# Patient Record
Sex: MALE | Race: BLACK OR AFRICAN AMERICAN | NOT HISPANIC OR LATINO | Employment: FULL TIME | URBAN - METROPOLITAN AREA
[De-identification: names, ages, dates, MRNs, and addresses within clinical notes are randomized per-mention and may not be internally consistent; named-entity substitution may affect disease eponyms.]

---

## 2018-01-20 ENCOUNTER — HOSPITAL ENCOUNTER (EMERGENCY)
Facility: HOSPITAL | Age: 33
Discharge: HOME | End: 2018-01-20
Attending: EMERGENCY MEDICINE

## 2018-01-20 VITALS
SYSTOLIC BLOOD PRESSURE: 108 MMHG | TEMPERATURE: 98 F | HEART RATE: 84 BPM | OXYGEN SATURATION: 99 % | DIASTOLIC BLOOD PRESSURE: 64 MMHG | BODY MASS INDEX: 23.06 KG/M2 | RESPIRATION RATE: 18 BRPM | WEIGHT: 174 LBS | HEIGHT: 73 IN

## 2018-01-20 DIAGNOSIS — T78.40XA ALLERGIC REACTION, INITIAL ENCOUNTER: Primary | ICD-10-CM

## 2018-01-20 DIAGNOSIS — R06.02 SOB (SHORTNESS OF BREATH): ICD-10-CM

## 2018-01-20 PROCEDURE — 63600175 PHARM REV CODE 636 W HCPCS: Performed by: EMERGENCY MEDICINE

## 2018-01-20 PROCEDURE — 25000003 PHARM REV CODE 250: Performed by: EMERGENCY MEDICINE

## 2018-01-20 PROCEDURE — 99284 EMERGENCY DEPT VISIT MOD MDM: CPT | Mod: 25

## 2018-01-20 PROCEDURE — 96375 TX/PRO/DX INJ NEW DRUG ADDON: CPT

## 2018-01-20 PROCEDURE — 96374 THER/PROPH/DIAG INJ IV PUSH: CPT

## 2018-01-20 PROCEDURE — 96361 HYDRATE IV INFUSION ADD-ON: CPT

## 2018-01-20 RX ORDER — PREDNISONE 20 MG/1
10 TABLET ORAL DAILY
Qty: 5 TABLET | Refills: 0 | Status: SHIPPED | OUTPATIENT
Start: 2018-01-21 | End: 2018-01-26

## 2018-01-20 RX ORDER — METHYLPREDNISOLONE SOD SUCC 125 MG
125 VIAL (EA) INJECTION
Status: COMPLETED | OUTPATIENT
Start: 2018-01-20 | End: 2018-01-20

## 2018-01-20 RX ORDER — DIPHENHYDRAMINE HYDROCHLORIDE 50 MG/ML
25 INJECTION INTRAMUSCULAR; INTRAVENOUS
Status: COMPLETED | OUTPATIENT
Start: 2018-01-20 | End: 2018-01-20

## 2018-01-20 RX ADMIN — DIPHENHYDRAMINE HYDROCHLORIDE 25 MG: 50 INJECTION, SOLUTION INTRAMUSCULAR; INTRAVENOUS at 05:01

## 2018-01-20 RX ADMIN — SODIUM CHLORIDE 1000 ML: 0.9 INJECTION, SOLUTION INTRAVENOUS at 05:01

## 2018-01-20 RX ADMIN — METHYLPREDNISOLONE SODIUM SUCCINATE 125 MG: 125 INJECTION, POWDER, FOR SOLUTION INTRAMUSCULAR; INTRAVENOUS at 05:01

## 2018-01-20 NOTE — ED PROVIDER NOTES
Encounter Date: 1/20/2018    SCRIBE #1 NOTE: I, Alan Salgado II, am scribing for, and in the presence of,  Jarvis Alicia MD. I have scribed the following portions of the note - Other sections scribed: HPI, ROS, PE, MDM.       History     Chief Complaint   Patient presents with    Allergic Reaction     Pt reports allergic reaction possibly to peanuts at 4pm. applied calamine lotion to chest. IV benadryl given by EMS. Rash improved. No complaint of respiratory difficulty or difficulty swallowing.      CC: Allergic Reaction     HPI: This 32 y.o. male with no significant PMHx presents to the ED for emergent evaluation of an allergic reaction prior to arrival. Pt reports he had an allergic reaction to an unknown allergen. Pt reports abdominal pain throughout the day but states when he returned home he began feeling warm with an itching sensation diffusely. He reports similar episode when he encounters Baclofen but denies any recent encounter. Pt reports eating peanuts but states he has never had a reaction in the past. Pt reports chest tightness, lip swelling, tongue swelling, and difficulty swallowing but states all these symptoms have since resolved. He reports using Calamine lotion with minimal alleviation. No alleviating or exacerbating factors. Pt denies sore throat, chest pain, and visual disturbance.       The history is provided by the patient. No  was used.     Review of patient's allergies indicates:   Allergen Reactions    Baclofen Anaphylaxis    Vicodin [hydrocodone-acetaminophen] Other (See Comments)     syncope     No past medical history on file.  No past surgical history on file.  Family History   Problem Relation Age of Onset    Diabetes Mother     Heart disease Mother     Hypertension Mother      Social History   Substance Use Topics    Smoking status: Current Every Day Smoker     Packs/day: 1.00     Types: Cigarettes    Smokeless tobacco: Never Used    Alcohol use No      Review of Systems   Constitutional: Negative for chills, diaphoresis and fever.   HENT: Negative for ear pain and sore throat.    Eyes:        (-) eye problems   Respiratory: Negative for cough and shortness of breath.    Cardiovascular: Negative for chest pain.   Gastrointestinal: Positive for abdominal pain and nausea. Negative for diarrhea and vomiting.   Genitourinary: Negative for dysuria.   Musculoskeletal: Negative for back pain.        (-) arm or leg pain   Skin: Negative for rash.   Neurological: Negative for headaches.       Physical Exam     Initial Vitals [01/20/18 1719]   BP Pulse Resp Temp SpO2   104/62 89 18 98.1 °F (36.7 °C) 98 %      MAP       76         Physical Exam    Nursing note and vitals reviewed.  Constitutional: He appears well-developed and well-nourished. No distress.   HENT:   Head: Normocephalic and atraumatic.   Mouth/Throat: Uvula is midline. No uvula swelling. Posterior oropharyngeal erythema present.     No lip or tongue swelling    No movement of uvula        Eyes: Conjunctivae and EOM are normal. Pupils are equal, round, and reactive to light.   Neck: Normal range of motion. Neck supple.   Cardiovascular: Normal rate, S1 normal, S2 normal and normal heart sounds.   Pulses:       Radial pulses are 2+ on the right side, and 2+ on the left side.   Pulmonary/Chest: Effort normal and breath sounds normal.   Abdominal: Soft. Normal appearance. Bowel sounds are decreased. There is no tenderness. There is no CVA tenderness.            Musculoskeletal: Normal range of motion.   Neurological: He is alert and oriented to person, place, and time. He has normal strength. No cranial nerve deficit or sensory deficit.   Skin: Skin is warm and dry. Rash noted.   Confluent red patch 4 cm in diameter at right proximal  medial scapula    Psychiatric: He has a normal mood and affect. His behavior is normal. Thought content normal.           ED Course   Procedures  Labs Reviewed - No data to  display     Imaging Results          X-Ray Chest 1 View (Final result)  Result time 01/20/18 18:26:53    Final result by Alton Simental MD (01/20/18 18:26:53)                 Impression:      No acute cardiopulmonary process identified.      Electronically signed by: ALTON SIMENTAL MD  Date:     01/20/18  Time:    18:26              Narrative:    Chest AP single view.  Comparison: None.    Cardiac silhouette is normal in size.  Mediastinal structures are midline.  Lungs are symmetrically expanded.  No evidence of focal consolidative process, pneumothorax, or significant effusion.  No acute osseous abnormality identified.                                 Medical Decision Making:   Initial Assessment:   Pt presented with an allergic reaction to an unknown allergen. He reports having abdominal pain with nausea. Pt reports tx using Calamine with minimal alleviation. Upon reevaluation the pt reports itching sensation has since resolved.  Differential Diagnosis:   We are ruling out anaphylaxis, urticaria, drug induced vs food induced allergic response  ED Management:  IV steroids and continue to monitor respiratory and cardiac status.   Upon reevaluation, Spouse states the pt is looking back to normal. The pt will be discharged with daily Prednisone and Benadryl  For the next 5 days            Scribe Attestation:   Scribe #1: I performed the above scribed service and the documentation accurately describes the services I performed. I attest to the accuracy of the note.    Attending Attestation:           Physician Attestation for Scribe:  Physician Attestation Statement for Scribe #1: I, Jarvis Alicia MD, reviewed documentation, as scribed by Alan Salgado II in my presence, and it is both accurate and complete.                 ED Course      Clinical Impression:   The encounter diagnosis was SOB (shortness of breath).  Allergic reaction - unknown cause          Disposition:   Disposition: Discharged  Condition:  Stable       Scribe attestation:  I agree with the  information collected for this note by the scribe, including history of present illness, review of systems, physical exam, and the plan                   Jarvis Alicia MD  01/20/18 4181

## 2018-01-20 NOTE — ED TRIAGE NOTES
Pt to the ED via EMS with c/o allergic reaction. Pt reports he was sitting on couch at home when he felt a burning sensation all over his body, he began itching and noticed hives. Pt reports SOB upon event. Upon current assessment no SOB or acute distress noted. Pt 98% on RA. Pt reports EMS gave benadryl IV prior to arrival.

## 2018-01-21 NOTE — ED NOTES
Report received from FLO Dugan. Pt resting comfortably in bed. In no acute distress. REU. AAO x 4. Will continue to monitor.

## 2018-01-21 NOTE — ED NOTES
Pt resting comfortably in bed. Fluids infusing. Denies SOB, itching, or pain. REU. AAO x 4. Will continue to monitor.

## 2018-01-21 NOTE — DISCHARGE INSTRUCTIONS
You can obtain benadryl tablets/capsules over the counter - these are  25 mg doses. Start taking one of these every 8 hours starting tomorrow morning and do so for the next 5 days. Be careful that benadryl can make you sleepy.   Be sure to keep track of your reactions to foods and any chemicals that might trigger skin or lip/tongue /swallowing/breathing problems   If you feel any swelling at your mouth or any problems breathing, come to the ED ASAP

## 2018-02-18 ENCOUNTER — HOSPITAL ENCOUNTER (EMERGENCY)
Facility: HOSPITAL | Age: 33
Discharge: HOME | End: 2018-02-18
Attending: EMERGENCY MEDICINE

## 2018-02-18 VITALS
SYSTOLIC BLOOD PRESSURE: 103 MMHG | BODY MASS INDEX: 22.93 KG/M2 | TEMPERATURE: 99 F | RESPIRATION RATE: 18 BRPM | HEART RATE: 70 BPM | OXYGEN SATURATION: 96 % | WEIGHT: 173 LBS | HEIGHT: 73 IN | DIASTOLIC BLOOD PRESSURE: 57 MMHG

## 2018-02-18 DIAGNOSIS — R50.9 ACUTE FEBRILE ILLNESS: ICD-10-CM

## 2018-02-18 DIAGNOSIS — J18.9 PNEUMONIA OF RIGHT LOWER LOBE DUE TO INFECTIOUS ORGANISM: ICD-10-CM

## 2018-02-18 DIAGNOSIS — R68.89 FLU-LIKE SYMPTOMS: Primary | ICD-10-CM

## 2018-02-18 DIAGNOSIS — R06.02 SHORTNESS OF BREATH: ICD-10-CM

## 2018-02-18 LAB
FLUAV AG SPEC QL IA: NEGATIVE
FLUBV AG SPEC QL IA: NEGATIVE
SPECIMEN SOURCE: NORMAL

## 2018-02-18 PROCEDURE — 25000242 PHARM REV CODE 250 ALT 637 W/ HCPCS: Performed by: EMERGENCY MEDICINE

## 2018-02-18 PROCEDURE — 94640 AIRWAY INHALATION TREATMENT: CPT

## 2018-02-18 PROCEDURE — 63600175 PHARM REV CODE 636 W HCPCS: Performed by: EMERGENCY MEDICINE

## 2018-02-18 PROCEDURE — 25000003 PHARM REV CODE 250: Performed by: EMERGENCY MEDICINE

## 2018-02-18 PROCEDURE — 96372 THER/PROPH/DIAG INJ SC/IM: CPT

## 2018-02-18 PROCEDURE — 25000003 PHARM REV CODE 250: Performed by: NURSE PRACTITIONER

## 2018-02-18 PROCEDURE — 99284 EMERGENCY DEPT VISIT MOD MDM: CPT | Mod: 25

## 2018-02-18 PROCEDURE — 87400 INFLUENZA A/B EACH AG IA: CPT

## 2018-02-18 RX ORDER — DOXYCYCLINE 100 MG/1
100 CAPSULE ORAL 2 TIMES DAILY
Qty: 20 CAPSULE | Refills: 0 | Status: SHIPPED | OUTPATIENT
Start: 2018-02-18 | End: 2018-02-28

## 2018-02-18 RX ORDER — ACETAMINOPHEN 500 MG
1000 TABLET ORAL
Status: COMPLETED | OUTPATIENT
Start: 2018-02-18 | End: 2018-02-18

## 2018-02-18 RX ORDER — IBUPROFEN 600 MG/1
600 TABLET ORAL
Status: COMPLETED | OUTPATIENT
Start: 2018-02-18 | End: 2018-02-18

## 2018-02-18 RX ORDER — KETOROLAC TROMETHAMINE 30 MG/ML
30 INJECTION, SOLUTION INTRAMUSCULAR; INTRAVENOUS
Status: COMPLETED | OUTPATIENT
Start: 2018-02-18 | End: 2018-02-18

## 2018-02-18 RX ORDER — DEXAMETHASONE SODIUM PHOSPHATE 4 MG/ML
8 INJECTION, SOLUTION INTRA-ARTICULAR; INTRALESIONAL; INTRAMUSCULAR; INTRAVENOUS; SOFT TISSUE
Status: COMPLETED | OUTPATIENT
Start: 2018-02-18 | End: 2018-02-18

## 2018-02-18 RX ORDER — OSELTAMIVIR PHOSPHATE 75 MG/1
75 CAPSULE ORAL 2 TIMES DAILY
Qty: 10 CAPSULE | Refills: 0 | Status: SHIPPED | OUTPATIENT
Start: 2018-02-18 | End: 2018-02-23

## 2018-02-18 RX ORDER — CODEINE PHOSPHATE AND GUAIFENESIN 10; 100 MG/5ML; MG/5ML
5 SOLUTION ORAL 3 TIMES DAILY PRN
Qty: 118 ML | Refills: 0 | Status: SHIPPED | OUTPATIENT
Start: 2018-02-18 | End: 2018-02-28

## 2018-02-18 RX ORDER — ALBUTEROL SULFATE 90 UG/1
2 AEROSOL, METERED RESPIRATORY (INHALATION) EVERY 4 HOURS PRN
Qty: 1 INHALER | Refills: 1 | Status: ON HOLD | OUTPATIENT
Start: 2018-02-18 | End: 2018-03-20

## 2018-02-18 RX ORDER — ONDANSETRON 8 MG/1
8 TABLET, ORALLY DISINTEGRATING ORAL
Status: COMPLETED | OUTPATIENT
Start: 2018-02-18 | End: 2018-02-18

## 2018-02-18 RX ORDER — MAG HYDROX/ALUMINUM HYD/SIMETH 200-200-20
30 SUSPENSION, ORAL (FINAL DOSE FORM) ORAL
Status: COMPLETED | OUTPATIENT
Start: 2018-02-18 | End: 2018-02-18

## 2018-02-18 RX ORDER — IPRATROPIUM BROMIDE AND ALBUTEROL SULFATE 2.5; .5 MG/3ML; MG/3ML
3 SOLUTION RESPIRATORY (INHALATION)
Status: COMPLETED | OUTPATIENT
Start: 2018-02-18 | End: 2018-02-18

## 2018-02-18 RX ADMIN — IPRATROPIUM BROMIDE AND ALBUTEROL SULFATE 3 ML: .5; 2.5 SOLUTION RESPIRATORY (INHALATION) at 07:02

## 2018-02-18 RX ADMIN — ALUMINUM HYDROXIDE, MAGNESIUM HYDROXIDE, AND SIMETHICONE 30 ML: 200; 200; 20 SUSPENSION ORAL at 06:02

## 2018-02-18 RX ADMIN — ONDANSETRON 8 MG: 8 TABLET, ORALLY DISINTEGRATING ORAL at 07:02

## 2018-02-18 RX ADMIN — KETOROLAC TROMETHAMINE 30 MG: 30 INJECTION, SOLUTION INTRAMUSCULAR at 07:02

## 2018-02-18 RX ADMIN — IBUPROFEN 600 MG: 600 TABLET, FILM COATED ORAL at 06:02

## 2018-02-18 RX ADMIN — ACETAMINOPHEN 1000 MG: 500 TABLET ORAL at 06:02

## 2018-02-18 RX ADMIN — DEXAMETHASONE SODIUM PHOSPHATE 8 MG: 4 INJECTION, SOLUTION INTRAMUSCULAR; INTRAVENOUS at 07:02

## 2018-02-19 NOTE — ED PROVIDER NOTES
Encounter Date: 2/18/2018       History     Chief Complaint   Patient presents with    Generalized Body Aches     body aches, cough - dry, sore throat, rib pains, headaches, chills, sweats, vomiting, diarrhea, and fever all started last pm.     The history is provided by the patient and medical records.   URI   The primary symptoms include fever, fatigue, cough, wheezing, nausea and myalgias. Primary symptoms do not include headaches, sore throat, swollen glands, abdominal pain, vomiting, arthralgias or rash. The current episode started two days ago. This is a new problem. The problem has been gradually worsening. The maximum temperature recorded prior to his arrival was 101 to 101.9 F.   The cough is productive. The sputum is yellow.     The wheezing had no precipitant. The patient's medical history does not include asthma, COPD, bronchiolitis or chronic lung disease.   Associated with: No known sick contacts or recent travel. The following treatments were addressed: Acetaminophen was ineffective. A decongestant was ineffective. NSAIDs were ineffective.     Review of patient's allergies indicates:   Allergen Reactions    Baclofen Anaphylaxis    Vicodin [hydrocodone-acetaminophen] Other (See Comments)     syncope     History reviewed. No pertinent past medical history.  Past Surgical History:   Procedure Laterality Date    WRIST SURGERY      As a child, L     Family History   Problem Relation Age of Onset    Diabetes Mother     Heart disease Mother     Hypertension Mother      Social History   Substance Use Topics    Smoking status: Former Smoker     Packs/day: 1.00     Types: Cigarettes    Smokeless tobacco: Never Used    Alcohol use No     Review of Systems   Constitutional: Positive for fatigue and fever.   HENT: Negative for sore throat.    Respiratory: Positive for cough and wheezing.    Gastrointestinal: Positive for nausea. Negative for abdominal pain and vomiting.   Musculoskeletal: Positive for  myalgias. Negative for arthralgias.   Skin: Negative for rash.   Neurological: Negative for headaches.   All other systems reviewed and are negative.        Physical Exam     Initial Vitals [18 1756]   BP Pulse Resp Temp SpO2   128/87 91 18 (!) 101.4 °F (38.6 °C) 98 %      MAP       100.67         Physical Exam    Nursing note and vitals reviewed.  Constitutional: He appears well-developed and well-nourished.   HENT:   Head: Normocephalic and atraumatic.   Eyes: Conjunctivae are normal. No scleral icterus.   Neck: Normal range of motion. Neck supple.   Cardiovascular: Normal rate and regular rhythm.   Pulmonary/Chest: No respiratory distress.   Diminished BS with slight expiratory wheezing lower lung fields.   Abdominal: Soft. There is no tenderness.   Musculoskeletal: Normal range of motion. He exhibits no edema or tenderness.   Neurological: He is alert and oriented to person, place, and time. He has normal strength.   Skin: Skin is warm and dry.   Psychiatric: He has a normal mood and affect. Thought content normal.         ED Course   Procedures  Labs Reviewed   INFLUENZA A AND B ANTIGEN             Medical Decision Making:   History:   Old Medical Records: I decided to obtain old medical records.  Old Records Summarized: other records.  Independently Interpreted Test(s):   I have ordered and independently interpreted X-rays - see prior notes.  Clinical Tests:   Lab Tests: Reviewed and Ordered  Radiological Study: Ordered and Reviewed      Differential diagnosis:   Initial differential diagnosis includes but is not limited to... URI, bronchitis, pneumonia, influenza, viral syndrome    Additional Medical Decision Makin-year-old male with no significant past medical history presents the emergency department complaining of flulike symptoms for the past 48 hours - see hpi for further details.  Physical exam reveals an uncomfortable-appearing otherwise healthy young male in no obvious distress.  He has  diminished overall breath sounds with faint wheezing to his lower lung bases.  Initial temp 101.4°F.  Chest x-ray pending.  IM Decadron given; albuterol/Atrovent nebs administered.        - Findings at this time are most consistent with bronchospasm secondary to influenza vs .  He has been started on Tamiflu and has been prescribed an albuterol inhaler to be used as needed.                      Clinical Impression:   The primary encounter diagnosis was Flu-like symptoms. Diagnoses of Shortness of breath, Pneumonia of right lower lobe due to infectious organism, and Acute febrile illness were also pertinent to this visit.    Disposition:   Disposition: Discharged  Condition: Stable                        Zeeshan oHlliday MD  02/18/18 2011

## 2018-02-19 NOTE — ED TRIAGE NOTES
Pt states that flu like symptoms x 2 days. Pt c/o headache, body aches, fever, nausea and nasal drainage.

## 2018-04-13 ENCOUNTER — HOSPITAL ENCOUNTER (OUTPATIENT)
Facility: HOSPITAL | Age: 33
Discharge: HOME | End: 2018-04-14
Attending: EMERGENCY MEDICINE | Admitting: HOSPITALIST

## 2018-04-13 DIAGNOSIS — K56.7 ILEUS: ICD-10-CM

## 2018-04-13 DIAGNOSIS — R65.20 SEVERE SEPSIS: ICD-10-CM

## 2018-04-13 DIAGNOSIS — R06.02 SOB (SHORTNESS OF BREATH): ICD-10-CM

## 2018-04-13 DIAGNOSIS — R11.2 NON-INTRACTABLE VOMITING WITH NAUSEA, UNSPECIFIED VOMITING TYPE: ICD-10-CM

## 2018-04-13 DIAGNOSIS — J18.9 PNEUMONIA OF RIGHT LOWER LOBE DUE TO INFECTIOUS ORGANISM: Primary | ICD-10-CM

## 2018-04-13 DIAGNOSIS — A41.9 SEVERE SEPSIS: ICD-10-CM

## 2018-04-13 DIAGNOSIS — A41.9 SEPSIS, DUE TO UNSPECIFIED ORGANISM: ICD-10-CM

## 2018-04-13 PROBLEM — G40.909 SEIZURE DISORDER: Status: ACTIVE | Noted: 2018-04-13

## 2018-04-13 PROBLEM — E11.9 TYPE 2 DIABETES MELLITUS: Status: ACTIVE | Noted: 2018-04-13

## 2018-04-13 LAB
ALBUMIN SERPL BCP-MCNC: 4 G/DL
ALP SERPL-CCNC: 75 U/L
ALT SERPL W/O P-5'-P-CCNC: 17 U/L
ANION GAP SERPL CALC-SCNC: 9 MMOL/L
APTT BLDCRRT: 28.1 SEC
AST SERPL-CCNC: 19 U/L
BACTERIA #/AREA URNS HPF: NORMAL /HPF
BASOPHILS # BLD AUTO: ABNORMAL K/UL
BASOPHILS NFR BLD: 0 %
BILIRUB SERPL-MCNC: 1.4 MG/DL
BILIRUB UR QL STRIP: NEGATIVE
BNP SERPL-MCNC: 87 PG/ML
BUN SERPL-MCNC: 12 MG/DL
CALCIUM SERPL-MCNC: 9.8 MG/DL
CHLORIDE SERPL-SCNC: 100 MMOL/L
CLARITY UR: CLEAR
CO2 SERPL-SCNC: 24 MMOL/L
COLOR UR: YELLOW
CREAT SERPL-MCNC: 1.2 MG/DL
DEPRECATED S PYO AG THROAT QL EIA: NEGATIVE
DIFFERENTIAL METHOD: ABNORMAL
EOSINOPHIL # BLD AUTO: ABNORMAL K/UL
EOSINOPHIL NFR BLD: 0 %
ERYTHROCYTE [DISTWIDTH] IN BLOOD BY AUTOMATED COUNT: 12.9 %
EST. GFR  (AFRICAN AMERICAN): >60 ML/MIN/1.73 M^2
EST. GFR  (NON AFRICAN AMERICAN): >60 ML/MIN/1.73 M^2
GLUCOSE SERPL-MCNC: 105 MG/DL
GLUCOSE UR QL STRIP: NEGATIVE
HCT VFR BLD AUTO: 42.3 %
HGB BLD-MCNC: 14.7 G/DL
HGB UR QL STRIP: ABNORMAL
INR PPP: 1.4
KETONES UR QL STRIP: ABNORMAL
LACTATE SERPL-SCNC: 2.1 MMOL/L
LACTATE SERPL-SCNC: 2.6 MMOL/L
LEUKOCYTE ESTERASE UR QL STRIP: NEGATIVE
LIPASE SERPL-CCNC: 8 U/L
LIPASE SERPL-CCNC: 9 U/L
LYMPHOCYTES # BLD AUTO: ABNORMAL K/UL
LYMPHOCYTES NFR BLD: 2 %
MAGNESIUM SERPL-MCNC: 1.6 MG/DL
MCH RBC QN AUTO: 32.3 PG
MCHC RBC AUTO-ENTMCNC: 34.8 G/DL
MCV RBC AUTO: 93 FL
MICROSCOPIC COMMENT: NORMAL
MONOCYTES # BLD AUTO: ABNORMAL K/UL
MONOCYTES NFR BLD: 3 %
NEUTROPHILS NFR BLD: 67 %
NEUTS BAND NFR BLD MANUAL: 28 %
NITRITE UR QL STRIP: NEGATIVE
PH UR STRIP: 7 [PH] (ref 5–8)
PHOSPHATE SERPL-MCNC: 3.4 MG/DL
PLATELET # BLD AUTO: 252 K/UL
PMV BLD AUTO: 10.3 FL
POCT GLUCOSE: 98 MG/DL (ref 70–110)
POTASSIUM SERPL-SCNC: 4.1 MMOL/L
PROT SERPL-MCNC: 7.9 G/DL
PROT UR QL STRIP: NEGATIVE
PROTHROMBIN TIME: 14.6 SEC
RBC # BLD AUTO: 4.55 M/UL
RBC #/AREA URNS HPF: 2 /HPF (ref 0–4)
SODIUM SERPL-SCNC: 133 MMOL/L
SP GR UR STRIP: 1.01 (ref 1–1.03)
SQUAMOUS #/AREA URNS HPF: 4 /HPF
TROPONIN I SERPL DL<=0.01 NG/ML-MCNC: <0.006 NG/ML
URN SPEC COLLECT METH UR: ABNORMAL
UROBILINOGEN UR STRIP-ACNC: NEGATIVE EU/DL
WBC # BLD AUTO: 25.64 K/UL
WBC #/AREA URNS HPF: 1 /HPF (ref 0–5)

## 2018-04-13 PROCEDURE — 80053 COMPREHEN METABOLIC PANEL: CPT

## 2018-04-13 PROCEDURE — 36415 COLL VENOUS BLD VENIPUNCTURE: CPT

## 2018-04-13 PROCEDURE — 94640 AIRWAY INHALATION TREATMENT: CPT

## 2018-04-13 PROCEDURE — 96365 THER/PROPH/DIAG IV INF INIT: CPT

## 2018-04-13 PROCEDURE — 25000003 PHARM REV CODE 250: Performed by: NURSE PRACTITIONER

## 2018-04-13 PROCEDURE — 84100 ASSAY OF PHOSPHORUS: CPT

## 2018-04-13 PROCEDURE — 63600175 PHARM REV CODE 636 W HCPCS: Performed by: NURSE PRACTITIONER

## 2018-04-13 PROCEDURE — 81000 URINALYSIS NONAUTO W/SCOPE: CPT

## 2018-04-13 PROCEDURE — 96375 TX/PRO/DX INJ NEW DRUG ADDON: CPT

## 2018-04-13 PROCEDURE — 25500020 PHARM REV CODE 255: Performed by: EMERGENCY MEDICINE

## 2018-04-13 PROCEDURE — 83690 ASSAY OF LIPASE: CPT | Mod: 91

## 2018-04-13 PROCEDURE — 85730 THROMBOPLASTIN TIME PARTIAL: CPT

## 2018-04-13 PROCEDURE — 83735 ASSAY OF MAGNESIUM: CPT

## 2018-04-13 PROCEDURE — 84484 ASSAY OF TROPONIN QUANT: CPT

## 2018-04-13 PROCEDURE — 83605 ASSAY OF LACTIC ACID: CPT | Mod: 91

## 2018-04-13 PROCEDURE — 96361 HYDRATE IV INFUSION ADD-ON: CPT

## 2018-04-13 PROCEDURE — G0378 HOSPITAL OBSERVATION PER HR: HCPCS

## 2018-04-13 PROCEDURE — 85027 COMPLETE CBC AUTOMATED: CPT

## 2018-04-13 PROCEDURE — 85007 BL SMEAR W/DIFF WBC COUNT: CPT

## 2018-04-13 PROCEDURE — 87880 STREP A ASSAY W/OPTIC: CPT

## 2018-04-13 PROCEDURE — 93010 ELECTROCARDIOGRAM REPORT: CPT | Mod: ,,, | Performed by: INTERNAL MEDICINE

## 2018-04-13 PROCEDURE — 83880 ASSAY OF NATRIURETIC PEPTIDE: CPT

## 2018-04-13 PROCEDURE — 87040 BLOOD CULTURE FOR BACTERIA: CPT | Mod: 59

## 2018-04-13 PROCEDURE — 93005 ELECTROCARDIOGRAM TRACING: CPT

## 2018-04-13 PROCEDURE — 25000003 PHARM REV CODE 250: Performed by: HOSPITALIST

## 2018-04-13 PROCEDURE — 83036 HEMOGLOBIN GLYCOSYLATED A1C: CPT

## 2018-04-13 PROCEDURE — 83605 ASSAY OF LACTIC ACID: CPT

## 2018-04-13 PROCEDURE — 25000242 PHARM REV CODE 250 ALT 637 W/ HCPCS: Performed by: NURSE PRACTITIONER

## 2018-04-13 PROCEDURE — 84145 PROCALCITONIN (PCT): CPT

## 2018-04-13 PROCEDURE — 87086 URINE CULTURE/COLONY COUNT: CPT

## 2018-04-13 PROCEDURE — 99285 EMERGENCY DEPT VISIT HI MDM: CPT | Mod: 25

## 2018-04-13 PROCEDURE — 85610 PROTHROMBIN TIME: CPT

## 2018-04-13 PROCEDURE — 87081 CULTURE SCREEN ONLY: CPT

## 2018-04-13 RX ORDER — MORPHINE SULFATE 4 MG/ML
4 INJECTION, SOLUTION INTRAMUSCULAR; INTRAVENOUS EVERY 6 HOURS PRN
Status: DISCONTINUED | OUTPATIENT
Start: 2018-04-13 | End: 2018-04-14 | Stop reason: HOSPADM

## 2018-04-13 RX ORDER — INSULIN ASPART 100 [IU]/ML
0-5 INJECTION, SOLUTION INTRAVENOUS; SUBCUTANEOUS
Status: DISCONTINUED | OUTPATIENT
Start: 2018-04-13 | End: 2018-04-14 | Stop reason: HOSPADM

## 2018-04-13 RX ORDER — ALBUTEROL SULFATE 2.5 MG/.5ML
2.5 SOLUTION RESPIRATORY (INHALATION) EVERY 6 HOURS
Status: DISCONTINUED | OUTPATIENT
Start: 2018-04-13 | End: 2018-04-14 | Stop reason: HOSPADM

## 2018-04-13 RX ORDER — ALBUTEROL SULFATE 90 UG/1
2 AEROSOL, METERED RESPIRATORY (INHALATION) EVERY 4 HOURS PRN
Status: CANCELLED | OUTPATIENT
Start: 2018-04-13

## 2018-04-13 RX ORDER — ACETAMINOPHEN 325 MG/1
650 TABLET ORAL EVERY 6 HOURS PRN
Status: DISCONTINUED | OUTPATIENT
Start: 2018-04-13 | End: 2018-04-14 | Stop reason: HOSPADM

## 2018-04-13 RX ORDER — GLUCAGON 1 MG
1 KIT INJECTION
Status: DISCONTINUED | OUTPATIENT
Start: 2018-04-13 | End: 2018-04-14 | Stop reason: HOSPADM

## 2018-04-13 RX ORDER — IBUPROFEN 200 MG
16 TABLET ORAL
Status: DISCONTINUED | OUTPATIENT
Start: 2018-04-13 | End: 2018-04-14 | Stop reason: HOSPADM

## 2018-04-13 RX ORDER — CEFTRIAXONE 2 G/50ML
2 INJECTION, SOLUTION INTRAVENOUS
Status: COMPLETED | OUTPATIENT
Start: 2018-04-13 | End: 2018-04-13

## 2018-04-13 RX ORDER — IPRATROPIUM BROMIDE AND ALBUTEROL SULFATE 2.5; .5 MG/3ML; MG/3ML
3 SOLUTION RESPIRATORY (INHALATION) EVERY 6 HOURS PRN
Status: DISCONTINUED | OUTPATIENT
Start: 2018-04-13 | End: 2018-04-14 | Stop reason: HOSPADM

## 2018-04-13 RX ORDER — OXYCODONE AND ACETAMINOPHEN 5; 325 MG/1; MG/1
1 TABLET ORAL EVERY 6 HOURS PRN
Status: DISCONTINUED | OUTPATIENT
Start: 2018-04-13 | End: 2018-04-14 | Stop reason: HOSPADM

## 2018-04-13 RX ORDER — ONDANSETRON 2 MG/ML
4 INJECTION INTRAMUSCULAR; INTRAVENOUS
Status: COMPLETED | OUTPATIENT
Start: 2018-04-13 | End: 2018-04-13

## 2018-04-13 RX ORDER — SODIUM CHLORIDE 9 MG/ML
INJECTION, SOLUTION INTRAVENOUS CONTINUOUS
Status: DISCONTINUED | OUTPATIENT
Start: 2018-04-13 | End: 2018-04-14 | Stop reason: HOSPADM

## 2018-04-13 RX ORDER — IBUPROFEN 200 MG
24 TABLET ORAL
Status: DISCONTINUED | OUTPATIENT
Start: 2018-04-13 | End: 2018-04-14 | Stop reason: HOSPADM

## 2018-04-13 RX ORDER — ONDANSETRON 8 MG/1
8 TABLET, ORALLY DISINTEGRATING ORAL EVERY 8 HOURS PRN
Status: DISCONTINUED | OUTPATIENT
Start: 2018-04-13 | End: 2018-04-14 | Stop reason: HOSPADM

## 2018-04-13 RX ADMIN — SODIUM CHLORIDE: 0.9 INJECTION, SOLUTION INTRAVENOUS at 06:04

## 2018-04-13 RX ADMIN — ACETAMINOPHEN 650 MG: 325 TABLET ORAL at 05:04

## 2018-04-13 RX ADMIN — ONDANSETRON 8 MG: 8 TABLET, ORALLY DISINTEGRATING ORAL at 05:04

## 2018-04-13 RX ADMIN — CEFTRIAXONE 2 G: 2 INJECTION, SOLUTION INTRAVENOUS at 03:04

## 2018-04-13 RX ADMIN — ALBUTEROL SULFATE 2.5 MG: 2.5 SOLUTION RESPIRATORY (INHALATION) at 08:04

## 2018-04-13 RX ADMIN — AZITHROMYCIN MONOHYDRATE 500 MG: 500 INJECTION, POWDER, LYOPHILIZED, FOR SOLUTION INTRAVENOUS at 03:04

## 2018-04-13 RX ADMIN — SODIUM CHLORIDE 1355 ML: 0.9 INJECTION, SOLUTION INTRAVENOUS at 01:04

## 2018-04-13 RX ADMIN — SODIUM CHLORIDE 1000 ML: 0.9 INJECTION, SOLUTION INTRAVENOUS at 12:04

## 2018-04-13 RX ADMIN — ONDANSETRON 4 MG: 2 INJECTION INTRAMUSCULAR; INTRAVENOUS at 12:04

## 2018-04-13 RX ADMIN — IOHEXOL 75 ML: 350 INJECTION, SOLUTION INTRAVENOUS at 02:04

## 2018-04-13 NOTE — ASSESSMENT & PLAN NOTE
With leucocytosis,tachypnea,tachycardia,elevated lactci acid,will continue with agressive IVF,IV Abx,recheck lactic acid level.

## 2018-04-13 NOTE — HPI
Patient is a 32-year-old male with past medical history asthma,DM,HTN,seizuer , who reports last 2 days he started with generalized body aches the proceeded to chills and fever that was subjective followed by nausea and vomiting that was bilious at times.  He reported a decreased appetite. He is reported to have a history of pneumonia in the right lower lobe 2 months ago and was prescribed antibiotics but reported that he did not take them.patient  Has severe sepsis with leucocytosis 25,tachypnea,tachycardia and elevated lactic acid 2.6.CT abdomen show left lower lobe pneumonia,he has also mild ileus,with no sign of  mass or obstruction,he denies abdominal pain,he has BM,patient has been started on IV Abx and IV antiemetic.

## 2018-04-13 NOTE — ED TRIAGE NOTES
C/o body ache x 2 days.  C/o Chills, fever, Nausea x 2 day. C/o SOB. Rib pain . Rate 10. Vomiting x 1 day.  Vomited x 10 today. Reports dysuria. Reports  Using girlfriend's breathing Tx prior to visiting ED.

## 2018-04-13 NOTE — ED NOTES
While giving report to Merari ROSSI Patient c/o nausea and achy bones again  Medicated as ordered.

## 2018-04-13 NOTE — ED PROVIDER NOTES
Encounter Date: 4/13/2018       History     Chief Complaint   Patient presents with    Emesis     reports vomitting sfor past two days, with accompanied loss of appetite, and weakness, reports SOB     Chief complaint: Emesis    History of present illness: Patient is a 32-year-old male who reports last 2 days he started with generalized body aches the proceeded to chills and fever that was subjective followed by nausea and vomiting that was bilious at times.  He reported a decreased appetite.  History significant for asthma, diabetes, hypertension, and seizures.  He is reported to have a history of pneumonia in the right lower lobe 2 months ago and was prescribed antibiotics but reported that he did not take them.          Review of patient's allergies indicates:   Allergen Reactions    Baclofen Anaphylaxis    Vicodin [hydrocodone-acetaminophen] Other (See Comments)     syncope     History reviewed. No pertinent past medical history.  Past Surgical History:   Procedure Laterality Date    WRIST SURGERY      As a child, L     Family History   Problem Relation Age of Onset    Diabetes Mother     Heart disease Mother     Hypertension Mother      Social History   Substance Use Topics    Smoking status: Former Smoker     Packs/day: 1.00     Types: Cigarettes    Smokeless tobacco: Never Used    Alcohol use No     Review of Systems   Constitutional: Positive for appetite change, chills and fever. Negative for diaphoresis and fatigue.   HENT: Negative for congestion, ear discharge, ear pain, postnasal drip, rhinorrhea, sinus pressure, sneezing, sore throat and voice change.    Eyes: Negative for discharge, itching and visual disturbance.   Respiratory: Negative for cough, shortness of breath and wheezing.    Cardiovascular: Negative for chest pain, palpitations and leg swelling.   Gastrointestinal: Positive for nausea and vomiting. Negative for abdominal pain.   Endocrine: Negative for polydipsia, polyphagia and  polyuria.   Genitourinary: Positive for dysuria. Negative for difficulty urinating, discharge, frequency, hematuria, penile pain, penile swelling and urgency.   Musculoskeletal: Negative for arthralgias and myalgias.   Skin: Negative for rash and wound.   Neurological: Negative for dizziness, seizures, syncope and weakness.   Hematological: Negative for adenopathy. Does not bruise/bleed easily.   Psychiatric/Behavioral: Negative for agitation and self-injury. The patient is not nervous/anxious.        Physical Exam     Initial Vitals [04/13/18 1153]   BP Pulse Resp Temp SpO2   (!) 108/59 (!) 123 20 100.3 °F (37.9 °C) 95 %      MAP       75.33         Physical Exam    Nursing note and vitals reviewed.  Constitutional: He appears well-developed and well-nourished. He is not diaphoretic. No distress. He is not intubated.   HENT:   Head: Normocephalic and atraumatic.   Right Ear: External ear normal.   Left Ear: External ear normal.   Nose: Nose normal. No epistaxis.   Mouth/Throat: Oropharynx is clear and moist.   Eyes: Pupils are equal, round, and reactive to light. Right eye exhibits no discharge. Left eye exhibits no discharge. No scleral icterus.   Neck: Normal range of motion.   Cardiovascular: Regular rhythm, S1 normal, S2 normal and normal heart sounds. Exam reveals no gallop.    No murmur heard.  Pulmonary/Chest: Effort normal and breath sounds normal. No accessory muscle usage. No apnea, no tachypnea and no bradypnea. He is not intubated. No respiratory distress. He has no decreased breath sounds. He has no wheezes. He has no rhonchi. He has no rales.   Abdominal: Soft. Normal appearance and bowel sounds are normal. He exhibits no distension. There is no tenderness.   Musculoskeletal: Normal range of motion.   Neurological: He is alert and oriented to person, place, and time.   Skin: Skin is dry. Capillary refill takes less than 2 seconds.         ED Course   Procedures  Labs Reviewed   CBC W/ AUTO  DIFFERENTIAL - Abnormal; Notable for the following:        Result Value    WBC 25.64 (*)     RBC 4.55 (*)     MCH 32.3 (*)     Lymph% 2.0 (*)     Mono% 3.0 (*)     All other components within normal limits   COMPREHENSIVE METABOLIC PANEL - Abnormal; Notable for the following:     Sodium 133 (*)     Total Bilirubin 1.4 (*)     All other components within normal limits   URINALYSIS - Abnormal; Notable for the following:     Ketones, UA Trace (*)     Occult Blood UA 1+ (*)     All other components within normal limits   LACTIC ACID, PLASMA - Abnormal; Notable for the following:     Lactate (Lactic Acid) 2.6 (*)     All other components within normal limits   PROTIME-INR - Abnormal; Notable for the following:     Prothrombin Time 14.6 (*)     INR 1.4 (*)     All other components within normal limits   THROAT SCREEN, RAPID   CULTURE, BLOOD   CULTURE, BLOOD   CULTURE, URINE   CULTURE, STREP A,  THROAT   LIPASE   MAGNESIUM   PHOSPHORUS   APTT   B-TYPE NATRIURETIC PEPTIDE   LIPASE   TROPONIN I   URINALYSIS MICROSCOPIC   PROCALCITONIN                   APC / Resident Notes:   Patient's 32-year-old male with a history of recent pneumonia that went untreated.  He presents with 2 days of fever and chills that is subjective, decreased appetite, nausea and vomiting and dysuria with a MAXIMUM TEMPERATURE of 100.3°F.  Physical assessment: Clear breath sounds bilaterally, regular rate and rhythm no murmurs clicks or rubs, ENT assessment was negative for signs of infection, there was no lymphadenopathy.  Skin was warm dry and intact.  Abdomen soft and nontender with bowel sounds ×4 quadrants.  Patient is afebrile and nontoxic-appearing    Urinalysis was positive for occult blood and ketones but negative for signs of infection.  There was only 2 RBCs per high-power field.  Lactic acid was 2.6 indicating possible sepsis.  The patient's heart rate was 104 on arrival with a 96% pulse ox and temperature 99.8.  He had 35 respirations per  minute.  Magnesium was 1.6 and phosphorus 3.4 well within normal limits.  PTT is within normal limits.  INR is 1.4.  BNP is 87 indicating the absence of CHF.  Lipase is 8 indicating negativity etiology of the hepatic biliary tree.  Troponin was negative indicating no damage to the heart.  Throat screen for strep is negative.  Blood cultures and pro-calcitonin are pending at time of disposition.  CBC indicates severe leukocytosis of 25.64, no anemia, no abnormalities of platelet count.  Compress a metabolic panel indicates mild hyponatremia, other electrolytes are all normal.  Total bilirubin is elevated at 1.4.  There is no transaminitis.  CT of the abdomen and pelvis indicates right lower lobe pneumonia, distended but not dilated gas and fluid filled small loops of bowel indicating a possible early ileus.  Hepatomegaly.  Chest x-ray also indicates right lower lobe pneumonia.  EKG shows a ventricular rate 96, no ST elevation MI, no ectopy and normal sinus rhythm.    Patient was given 1 g Rocephin IV, azithromycin 500 mg IV, 2355 mL normal saline, Zofran 4 in the emergency department.    Patient was admitted to observation status with prophylaxis for DVTs, vital signs every 4, parameters to notify physician, cardiac monitors, activity as tolerated, INR per nursing, saline lock IV,  daily CBC and chemistry, A1c in the morning, albuterol sulfate every 6 hours, Rocephin 1 g and a to the medicine 500 IV daily.    Care plan was agreed up on by Dr. Ramos and conveyed to JOSTIN Alfaro for observation.  Dr. Tee accepting.              ED Course as of Apr 13 1554 Fri Apr 13, 2018   1312 CBC: leukocyte count was increased, the H&H was normal. The platelet count was normal. This indicates probable infection.      [VC]   1312 The chemistry was negative for hypo-or hyper  kalemia, chloridemia, or other electrolyte abnormalities; BUN and creatinine were within normal limits indicating normal kidney function, ALT and AST were  within normal limits indicating normal liver function, there was no transaminitis.   NA is 133.    [VC]   1312 Lipase was within normal limits indicating unlikely pancreatitis or congestive obstruction of the hepatobiliary tree.    [VC]   1313 Findings concerning for worsening right lower lobe airspace disease including aspiration or pneumonia in the proper clinical setting.  Short-term follow-up chest radiography after therapy is recommended to resolution. X-Ray Chest PA And Lateral (!) [VC]   1509 Phosphorus: 3.4 [VC]   1509 Lactate, Mo: (!) 2.6 [VC]   1509 Rapid Strep A Screen: Negative [VC]   1509 Magnesium: 1.6 [VC]   1509 Lipase: 8 [VC]   1509 aPTT: 28.1 [VC]   1509 No sign of infection in urine.  Occult blood 1+ negated by 2rbc/hpf on microscopic.  [VC]   1536 Troponin I: <0.006 [VC]   1536 BNP: 87 [VC]   1536 CT Abdomen Pelvis With Contrast (!) [VC]   1537 1.   Findings concerning for right lower lobe airspace disease including nonspecific pneumonia from infectious or inflammatory etiology.  Aspiration is considered less likely.  Given the corresponding abnormality on conventional radiography performed same date, short-term follow-up with chest radiography after therapy is recommended to ensure resolution.    2.   Distended but not yet pathologically dilated gas and fluid-filled small bowel loops throughout the abdomen and pelvis without definite focal wall thickening or adjacent inflammatory change.  These could represent sequela of a nonspecific enteritis, with impending ileus or obstruction not entirely excluded.  No CT evidence of high-grade obstruction at this time.    3.   Hepatomegaly.    4.   Stable nonobstructing nephrolith with cortical scarring at the left renal lower pole. CT Abdomen Pelvis With Contrast (!) [VC]      ED Course User Index  [VC] Ajit Francis DNP     Clinical Impression:   The primary encounter diagnosis was Pneumonia of right lower lobe due to infectious organism.  Diagnoses of SOB (shortness of breath) and Sepsis, due to unspecified organism were also pertinent to this visit.    Disposition:   Disposition: Admitted  Condition: Stable                        Ajit Francis, JESUS  04/13/18 1601

## 2018-04-13 NOTE — ED PROVIDER NOTES
"Encounter Date: 4/13/2018    SCRIBE #1 NOTE: I, Sharon Victoria, am scribing for, and in the presence of,  Riya Agustin MD. I have scribed the following portions of the note - Other sections scribed: HPI, ROS and PE.       History     Chief Complaint   Patient presents with    Emesis     reports vomitting sfor past two days, with accompanied loss of appetite, and weakness, reports SOB     CC: Emesis    HPI: This 32 y.o. Male, former smoker, with no medical history presents to the ED c/o emesis for the past 2x days. Pt reports that he has experienced a significant amount of vomiting since the onset of symptoms, noting that he began vomiting up "bile" and became dehydrated. He adds that the symptoms have been accompanied by a decreased appetite, generalized body aches, weakness, a subjective fever and chills. Pt reports taking Motrin, Ibuprofen and Maalox for treatment. He states that the symptoms worsened last night, but notes that he presently feels better as the symptoms improved with use of Maalox. Pt notes that he experienced his last bowel movement this morning. He reports that the present symptoms, as well as current respiratory issues, may be due to his current living environment as he reports that mice from a nearby recently demolished shed have infiltrated all of the homes in the area, noting that he constantly finds mice droppings throughout his home. Pt denies diarrhea, leg swelling an rash. No other associated symptoms. No other associated symptoms. Pt notes that he quit smoking 2x months ago.          The history is provided by the patient. No  was used.     Review of patient's allergies indicates:   Allergen Reactions    Baclofen Anaphylaxis    Vicodin [hydrocodone-acetaminophen] Other (See Comments)     syncope     History reviewed. No pertinent past medical history.  Past Surgical History:   Procedure Laterality Date    WRIST SURGERY      As a child, L     Family History "   Problem Relation Age of Onset    Diabetes Mother     Heart disease Mother     Hypertension Mother      Social History   Substance Use Topics    Smoking status: Former Smoker     Packs/day: 1.00     Types: Cigarettes    Smokeless tobacco: Never Used    Alcohol use No     Review of Systems   Constitutional: Positive for appetite change (decreased), chills and fever.   HENT: Negative for congestion, ear pain, rhinorrhea and sore throat.    Eyes: Negative for pain and visual disturbance.   Respiratory: Positive for cough and shortness of breath.    Cardiovascular: Negative for chest pain.   Gastrointestinal: Positive for vomiting. Negative for abdominal pain and diarrhea.   Genitourinary: Negative for dysuria.   Musculoskeletal: Positive for myalgias (generalized). Negative for back pain and neck pain.   Skin: Negative for rash.   Neurological: Positive for weakness. Negative for headaches.       Physical Exam     Initial Vitals [04/13/18 1153]   BP Pulse Resp Temp SpO2   (!) 108/59 (!) 123 20 100.3 °F (37.9 °C) 95 %      MAP       75.33         Physical Exam    Nursing note and vitals reviewed.  Constitutional: Vital signs are normal. He appears well-developed and well-nourished. He is active.  Non-toxic appearance. No distress.   HENT:   Head: Normocephalic and atraumatic.   Mouth/Throat: Posterior oropharyngeal erythema present.   Eyes: EOM are normal.   Neck: Trachea normal. Neck supple.   Cardiovascular: Normal rate, regular rhythm, normal heart sounds and normal pulses.   Pulmonary/Chest: Breath sounds normal. No respiratory distress. He has no wheezes. He has no rhonchi. He has no rales.   Abdominal: Soft. Normal appearance and bowel sounds are normal. He exhibits no distension. There is no tenderness.   Musculoskeletal: Normal range of motion. He exhibits no edema.   Neurological: He is alert and oriented to person, place, and time.   Skin: Skin is warm, dry and intact.   Psychiatric: He has a normal  mood and affect.         ED Course   Procedures  Labs Reviewed   CBC W/ AUTO DIFFERENTIAL - Abnormal; Notable for the following:        Result Value    WBC 25.64 (*)     RBC 4.55 (*)     MCH 32.3 (*)     Lymph% 2.0 (*)     Mono% 3.0 (*)     All other components within normal limits   COMPREHENSIVE METABOLIC PANEL - Abnormal; Notable for the following:     Sodium 133 (*)     Total Bilirubin 1.4 (*)     All other components within normal limits   LACTIC ACID, PLASMA - Abnormal; Notable for the following:     Lactate (Lactic Acid) 2.6 (*)     All other components within normal limits   THROAT SCREEN, RAPID   CULTURE, BLOOD   CULTURE, BLOOD   CULTURE, URINE   CULTURE, STREP A,  THROAT   LIPASE   URINALYSIS   MAGNESIUM   PHOSPHORUS   APTT   PROTIME-INR   B-TYPE NATRIURETIC PEPTIDE   LIPASE   TROPONIN I   PROCALCITONIN   URINALYSIS MICROSCOPIC                        Scribe Attestation:   Scribe #1: I performed the above scribed service and the documentation accurately describes the services I performed. I attest to the accuracy of the note.    Attending Attestation:           Physician Attestation for Scribe:  Physician Attestation Statement for Scribe #1: I, Riya Agustin MD, reviewed documentation, as scribed by Sharon Victoria in my presence, and it is both accurate and complete.                 ED Course as of Apr 13 1544 Fri Apr 13, 2018   1312 CBC: leukocyte count was increased, the H&H was normal. The platelet count was normal. This indicates probable infection.      [VC]   1312 The chemistry was negative for hypo-or hyper  kalemia, chloridemia, or other electrolyte abnormalities; BUN and creatinine were within normal limits indicating normal kidney function, ALT and AST were within normal limits indicating normal liver function, there was no transaminitis.   NA is 133.    [VC]   1312 Lipase was within normal limits indicating unlikely pancreatitis or congestive obstruction of the hepatobiliary tree.    [VC]    1313 Findings concerning for worsening right lower lobe airspace disease including aspiration or pneumonia in the proper clinical setting.  Short-term follow-up chest radiography after therapy is recommended to resolution. X-Ray Chest PA And Lateral (!) [VC]   1509 Phosphorus: 3.4 [VC]   1509 Lactate, Mo: (!) 2.6 [VC]   1509 Rapid Strep A Screen: Negative [VC]   1509 Magnesium: 1.6 [VC]   1509 Lipase: 8 [VC]   1509 aPTT: 28.1 [VC]   1509 No sign of infection in urine.  Occult blood 1+ negated by 2rbc/hpf on microscopic.  [VC]   1536 Troponin I: <0.006 [VC]   1536 BNP: 87 [VC]   1536 CT Abdomen Pelvis With Contrast (!) [VC]   1537 1.   Findings concerning for right lower lobe airspace disease including nonspecific pneumonia from infectious or inflammatory etiology.  Aspiration is considered less likely.  Given the corresponding abnormality on conventional radiography performed same date, short-term follow-up with chest radiography after therapy is recommended to ensure resolution.    2.   Distended but not yet pathologically dilated gas and fluid-filled small bowel loops throughout the abdomen and pelvis without definite focal wall thickening or adjacent inflammatory change.  These could represent sequela of a nonspecific enteritis, with impending ileus or obstruction not entirely excluded.  No CT evidence of high-grade obstruction at this time.    3.   Hepatomegaly.    4.   Stable nonobstructing nephrolith with cortical scarring at the left renal lower pole. CT Abdomen Pelvis With Contrast (!) [VC]      ED Course User Index  [VC] Ajit Francis DNP     Clinical Impression:   Diagnoses of SOB (shortness of breath) and SOB (shortness of breath) were pertinent to this visit.                Imaging Results          CT Abdomen Pelvis With Contrast (Final result)     Abnormal  Result time 04/13/18 15:10:34    Final result by Victor Hugo Tsai MD (04/13/18 15:10:34)                 Impression:      1.   Findings  concerning for right lower lobe airspace disease including nonspecific pneumonia from infectious or inflammatory etiology.  Aspiration is considered less likely.  Given the corresponding abnormality on conventional radiography performed same date, short-term follow-up with chest radiography after therapy is recommended to ensure resolution.    2.   Distended but not yet pathologically dilated gas and fluid-filled small bowel loops throughout the abdomen and pelvis without definite focal wall thickening or adjacent inflammatory change.  These could represent sequela of a nonspecific enteritis, with impending ileus or obstruction not entirely excluded.  No CT evidence of high-grade obstruction at this time.    3.   Hepatomegaly.    4.   Stable nonobstructing nephrolith with cortical scarring at the left renal lower pole.    This report was flagged in Epic as abnormal.      Electronically signed by: Victor Hugo Tsai MD  Date:    04/13/2018  Time:    15:10             Narrative:    EXAMINATION:  CT ABDOMEN PELVIS WITH CONTRAST    CLINICAL HISTORY:  abdominal pain;    TECHNIQUE:  Low dose axial images, sagittal and coronal reformations were obtained from the lung bases to the pubic symphysis following the IV administration of 75 mL of Omnipaque 350 contrast.  No oral contrast was administered.    COMPARISON:  Chest radiograph same day and CT abdomen and pelvis 05/09/2013.    FINDINGS:  Study is somewhat limited by paucity of intra-abdominal fat with closely apposed loops of bowel.  There is also technical artifact as well as beam hardening with streak artifact from overlying monitoring leads limiting evaluation.    There is partial consolidation with adjacent ill-defined ground-glass attenuation as well as multiple subcentimeter subtle ground-glass nodules involving the imaged right lower lobe, primarily along the anterior and lateral aspect suggestive for nonspecific pneumonia.  Aspiration is considered less likely given  the distribution.  No associated pleural fluid.  Imaged left lung base is clear.  The heart is normal in size without significant pericardial fluid.    Liver is enlarged without focal process seen.  The gallbladder, spleen, stomach, duodenum, pancreas and bilateral adrenal glands are within normal limits.  No biliary ductal dilatation.    Kidneys are normal in size and location with stable left renal lower pole calculus and adjacent cortical scarring.  No hydronephrosis or significant perinephric stranding.  Bilateral ureters are normal in course and caliber.  Urinary bladder is within normal limits.  Prostate and seminal vesicles are within normal limits.  No significant amount of free pelvic fluid.    Appendix and terminal ileum are within normal limits.  Colon is relatively decompressed along the majority of its course.  There are several distended but not yet pathologically dilated gas and fluid-filled small bowel loops noted throughout the abdomen and pelvis without definite focal wall thickening or adjacent inflammatory change.  No pneumatosis or portal venous gas.  Tiny fat containing umbilical hernia.    No ascites, free air or lymphadenopathy definitively seen.    Aorta is normal in course and caliber.    Osseous structures appear intact.                               X-Ray Chest PA And Lateral (Final result)     Abnormal  Result time 04/13/18 13:03:27    Final result by Victor Hugo Tsai MD (04/13/18 13:03:27)                 Impression:      Findings concerning for worsening right lower lobe airspace disease including aspiration or pneumonia in the proper clinical setting.  Short-term follow-up chest radiography after therapy is recommended to resolution.    This report was flagged in Epic as abnormal.      Electronically signed by: Victor Hugo Tsai MD  Date:    04/13/2018  Time:    13:03             Narrative:    EXAMINATION:  XR CHEST PA AND LATERAL    CLINICAL HISTORY:  Shortness of breath    TECHNIQUE:  PA  and lateral views of the chest were performed.    COMPARISON:  Chest radiograph 02/18/2018    FINDINGS:  Interval increased mixed alveolar and interstitial opacities within the right lower lobe.  Left hemithorax is grossly clear.  No pleural effusion or pneumothorax.  Cardiomediastinal silhouette is midline and within normal limits.  Pulmonary vasculature and hilar regions are within normal limits.  Included osseous structures are stable without acute process seen.                                IMP: pneumonia; dehydration, vomiting  Plan: IV abx, trial of PO           Riya Agustin MD  04/13/18 1522       Riya Agustin MD  04/13/18 1523       Riya Agustin MD  04/13/18 1549

## 2018-04-13 NOTE — ASSESSMENT & PLAN NOTE
Per clinical finding with cough,untreated diagnosed pneumonia and CT finding with RLL pneumonia,patient has been started on IV Abx with Rocephin and azithromycin.

## 2018-04-13 NOTE — SUBJECTIVE & OBJECTIVE
History reviewed. No pertinent past medical history.    Past Surgical History:   Procedure Laterality Date    WRIST SURGERY      As a child, L       Review of patient's allergies indicates:   Allergen Reactions    Baclofen Anaphylaxis    Vicodin [hydrocodone-acetaminophen] Other (See Comments)     syncope       No current facility-administered medications on file prior to encounter.      Current Outpatient Prescriptions on File Prior to Encounter   Medication Sig    acetaminophen (TYLENOL) 325 MG tablet Take 325 mg by mouth every 6 (six) hours as needed.    albuterol 90 mcg/actuation inhaler Inhale 2 puffs into the lungs every 4 (four) hours as needed for Wheezing or Shortness of Breath (shortness of breath/wheezing).     Family History     Problem Relation (Age of Onset)    Diabetes Mother    Heart disease Mother    Hypertension Mother        Social History Main Topics    Smoking status: Former Smoker     Packs/day: 1.00     Types: Cigarettes    Smokeless tobacco: Never Used    Alcohol use No    Drug use: No    Sexual activity: Yes     Partners: Female     Birth control/ protection: Condom     Review of Systems   Constitutional: Positive for activity change, appetite change, fatigue and fever.   HENT: Negative for congestion.    Eyes: Negative for discharge and itching.   Respiratory: Positive for cough and shortness of breath.    Cardiovascular: Negative for chest pain.   Gastrointestinal: Positive for nausea and vomiting.   Endocrine: Negative for cold intolerance and heat intolerance.   Genitourinary: Negative for difficulty urinating.   Musculoskeletal: Negative for arthralgias.   Skin: Negative for color change and pallor.   Allergic/Immunologic: Negative for environmental allergies and food allergies.   Neurological: Negative for dizziness.   Hematological: Negative for adenopathy. Does not bruise/bleed easily.   Psychiatric/Behavioral: Negative for agitation and behavioral problems.     Objective:      Vital Signs (Most Recent):  Temp: 99.8 °F (37.7 °C) (04/13/18 1328)  Pulse: 104 (04/13/18 1531)  Resp: (!) 35 (04/13/18 1531)  BP: 112/64 (04/13/18 1531)  SpO2: 96 % (04/13/18 1531) Vital Signs (24h Range):  Temp:  [99.8 °F (37.7 °C)-100.3 °F (37.9 °C)] 99.8 °F (37.7 °C)  Pulse:  [] 104  Resp:  [20-35] 35  SpO2:  [95 %-99 %] 96 %  BP: (106-115)/(58-64) 112/64     Weight: 78.5 kg (173 lb)  Body mass index is 22.82 kg/m².    Physical Exam   Constitutional: He is oriented to person, place, and time. No distress.   HENT:   Head: Atraumatic.   Eyes: EOM are normal. Pupils are equal, round, and reactive to light.   Neck: Normal range of motion. Neck supple.   Cardiovascular:   tachycardia   Pulmonary/Chest: Effort normal and breath sounds normal.   Abdominal: Soft. Bowel sounds are normal.   Musculoskeletal: Normal range of motion. He exhibits no edema or deformity.   Neurological: He is oriented to person, place, and time. No cranial nerve deficit. Coordination normal.   Skin: Skin is warm and dry.   Psychiatric: He has a normal mood and affect. His behavior is normal.         CRANIAL NERVES     CN III, IV, VI   Pupils are equal, round, and reactive to light.  Extraocular motions are normal.        Significant Labs:   CBC:   Recent Labs  Lab 04/13/18  1219   WBC 25.64*   HGB 14.7   HCT 42.3        CMP:   Recent Labs  Lab 04/13/18  1219   *   K 4.1      CO2 24      BUN 12   CREATININE 1.2   CALCIUM 9.8   PROT 7.9   ALBUMIN 4.0   BILITOT 1.4*   ALKPHOS 75   AST 19   ALT 17   ANIONGAP 9   EGFRNONAA >60       Significant Imaging:reviewed.

## 2018-04-13 NOTE — PLAN OF CARE
04/13/18 1619   Discharge Assessment   Assessment Type Discharge Planning Assessment   Confirmed/corrected address and phone number on facesheet? Yes   Assessment information obtained from? Patient   Communicated expected length of stay with patient/caregiver no   Prior to hospitilization cognitive status: Alert/Oriented   Prior to hospitalization functional status: Independent   Current cognitive status: Alert/Oriented   Current Functional Status: Independent   Facility Arrived From: home   Lives With parent(s)   Able to Return to Prior Arrangements yes   Is patient able to care for self after discharge? Yes   Who are your caregiver(s) and their phone number(s)? mother: mitchell Corona: [phone]   Patient's perception of discharge disposition home or selfcare   Readmission Within The Last 30 Days no previous admission in last 30 days   Patient currently being followed by outpatient case management? No   Patient currently receives any other outside agency services? No   Equipment Currently Used at Home none   Do you have any problems affording any of your prescribed medications? TBD  (Uninsured.)   Does the patient have transportation home? Yes   Transportation Available family or friend will provide   Dialysis Name and Scheduled days n/a   Does the patient receive services at the [facility]? No   Discharge Plan A Home   Discharge Plan B Home   Patient/Family In Agreement With Plan yes  (Patient from home and independent.  Lives with mother.  Pt currently uninsured.  HOLLY provided patient with a list of community clinics and [facility] flyer.)   Patient from home with from home and independent.  Currently insured.  HOLLY provided patient with a list of community clinics and a [facility] flyer.  Makenzie Connor LMSW, FERN, [facility]  4/13/2018

## 2018-04-13 NOTE — LETTER
April 14, 2018         [place] [address]  Phone: [phone]  Fax: [phone]       Patient: Rosalio Corona   YOB: 1985  Date of Visit: 04/14/2018    To Whom It May Concern:    Bryan Corona  was at [facility] on 04/14/2018. He may return to work/school on 4/20/18 with no restrictions. If you have any questions or concerns, or if I can be of further assistance, please do not hesitate to contact me.    Sincerely,          Dominic Long Jr., NP

## 2018-04-13 NOTE — H&P
[facility] Hospital Medicine  History & Physical    Patient Name: Rosalio Corona  MRN: [id number]  Admission Date: 4/13/2018  Attending Physician: Nay   Primary Care Provider: Primary Doctor No         Patient information was obtained from patient and ER records.     Subjective:     Principal Problem:Severe sepsis    Chief Complaint:   Chief Complaint   Patient presents with    Emesis     reports vomitting sfor past two days, with accompanied loss of appetite, and weakness, reports SOB        HPI: Patient is a 32-year-old male with past medical history asthma,DM,HTN,seizuer , who reports last 2 days he started with generalized body aches the proceeded to chills and fever that was subjective followed by nausea and vomiting that was bilious at times.  He reported a decreased appetite. He is reported to have a history of pneumonia in the right lower lobe 2 months ago and was prescribed antibiotics but reported that he did not take them.patient  Has severe sepsis with leucocytosis 25,tachypnea,tachycardia and elevated lactic acid 2.6.CT abdomen show left lower lobe pneumonia,he has also mild ileus,with no sign of  mass or obstruction,he denies abdominal pain,he has BM,patient has been started on IV Abx and IV antiemetic.    History reviewed. No pertinent past medical history.    Past Surgical History:   Procedure Laterality Date    WRIST SURGERY      As a child, L       Review of patient's allergies indicates:   Allergen Reactions    Baclofen Anaphylaxis    Vicodin [hydrocodone-acetaminophen] Other (See Comments)     syncope       No current facility-administered medications on file prior to encounter.      Current Outpatient Prescriptions on File Prior to Encounter   Medication Sig    acetaminophen (TYLENOL) 325 MG tablet Take 325 mg by mouth every 6 (six) hours as needed.    albuterol 90 mcg/actuation inhaler Inhale 2 puffs into the lungs every 4 (four) hours as needed for Wheezing or  Shortness of Breath (shortness of breath/wheezing).     Family History     Problem Relation (Age of Onset)    Diabetes Mother    Heart disease Mother    Hypertension Mother        Social History Main Topics    Smoking status: Former Smoker     Packs/day: 1.00     Types: Cigarettes    Smokeless tobacco: Never Used    Alcohol use No    Drug use: No    Sexual activity: Yes     Partners: Female     Birth control/ protection: Condom     Review of Systems   Constitutional: Positive for activity change, appetite change, fatigue and fever.   HENT: Negative for congestion.    Eyes: Negative for discharge and itching.   Respiratory: Positive for cough and shortness of breath.    Cardiovascular: Negative for chest pain.   Gastrointestinal: Positive for nausea and vomiting.   Endocrine: Negative for cold intolerance and heat intolerance.   Genitourinary: Negative for difficulty urinating.   Musculoskeletal: Negative for arthralgias.   Skin: Negative for color change and pallor.   Allergic/Immunologic: Negative for environmental allergies and food allergies.   Neurological: Negative for dizziness.   Hematological: Negative for adenopathy. Does not bruise/bleed easily.   Psychiatric/Behavioral: Negative for agitation and behavioral problems.     Objective:     Vital Signs (Most Recent):  Temp: 99.8 °F (37.7 °C) (04/13/18 1328)  Pulse: 104 (04/13/18 1531)  Resp: (!) 35 (04/13/18 1531)  BP: 112/64 (04/13/18 1531)  SpO2: 96 % (04/13/18 1531) Vital Signs (24h Range):  Temp:  [99.8 °F (37.7 °C)-100.3 °F (37.9 °C)] 99.8 °F (37.7 °C)  Pulse:  [] 104  Resp:  [20-35] 35  SpO2:  [95 %-99 %] 96 %  BP: (106-115)/(58-64) 112/64     Weight: 78.5 kg (173 lb)  Body mass index is 22.82 kg/m².    Physical Exam   Constitutional: He is oriented to person, place, and time. No distress.   HENT:   Head: Atraumatic.   Eyes: EOM are normal. Pupils are equal, round, and reactive to light.   Neck: Normal range of motion. Neck supple.    Cardiovascular:   tachycardia   Pulmonary/Chest: Effort normal and breath sounds normal.   Abdominal: Soft. Bowel sounds are normal.   Musculoskeletal: Normal range of motion. He exhibits no edema or deformity.   Neurological: He is oriented to person, place, and time. No cranial nerve deficit. Coordination normal.   Skin: Skin is warm and dry.   Psychiatric: He has a normal mood and affect. His behavior is normal.         CRANIAL NERVES     CN III, IV, VI   Pupils are equal, round, and reactive to light.  Extraocular motions are normal.        Significant Labs:   CBC:   Recent Labs  Lab 04/13/18  1219   WBC 25.64*   HGB 14.7   HCT 42.3        CMP:   Recent Labs  Lab 04/13/18  1219   *   K 4.1      CO2 24      BUN 12   CREATININE 1.2   CALCIUM 9.8   PROT 7.9   ALBUMIN 4.0   BILITOT 1.4*   ALKPHOS 75   AST 19   ALT 17   ANIONGAP 9   EGFRNONAA >60       Significant Imaging:reviewed.    Assessment/Plan:     * Severe sepsis      With leucocytosis,tachypnea,tachycardia,elevated lactci acid,will continue with agressive IVF,IV Abx,recheck lactic acid level.        Pneumonia of right lower lobe due to infectious organism      Per clinical finding with cough,untreated diagnosed pneumonia and CT finding with RLL pneumonia,patient has been started on IV Abx with Rocephin and azithromycin.        Ileus    Per CT ,patient also has nausea,vomiting,but he denies abdominal pain,he has BM,possible mild ileus,do not think he require NG tube,started on clear diet,on IVFand IV Antiemetic.          Nausea and vomiting      On IV Phenergan,started on clear diet.will monitor.        Seizure disorder      He is not on any AED.will monitor.        Type 2 diabetes mellitus      On SSI.          VTE Risk Mitigation     None             Calvin Carpio MD  Department of Hospital Medicine   [facility]

## 2018-04-13 NOTE — ASSESSMENT & PLAN NOTE
Per CT ,patient also has nausea,vomiting,but he denies abdominal pain,he has BM,possible mild ileus,do not think he require NG tube,started on clear diet,on IVFand IV Antiemetic.

## 2018-04-13 NOTE — NURSING
Received patient from ER to room via . Patient accompanied by transport and family. Transferred patient to bed. Evaluated general patient appearance and condition. Admit assessment initiated. Tele monitoring initiated. Saline lock at LAC Intact, ivf initiated. No apparent distress noted at this time.

## 2018-04-14 VITALS
OXYGEN SATURATION: 98 % | TEMPERATURE: 99 F | RESPIRATION RATE: 18 BRPM | BODY MASS INDEX: 21.01 KG/M2 | WEIGHT: 158.5 LBS | DIASTOLIC BLOOD PRESSURE: 63 MMHG | HEART RATE: 114 BPM | SYSTOLIC BLOOD PRESSURE: 128 MMHG | HEIGHT: 73 IN

## 2018-04-14 PROBLEM — K56.7 ILEUS: Status: RESOLVED | Noted: 2018-04-13 | Resolved: 2018-04-14

## 2018-04-14 PROBLEM — R11.2 NAUSEA AND VOMITING: Status: RESOLVED | Noted: 2018-04-13 | Resolved: 2018-04-14

## 2018-04-14 PROBLEM — G40.909 SEIZURE DISORDER: Status: RESOLVED | Noted: 2018-04-13 | Resolved: 2018-04-14

## 2018-04-14 PROBLEM — E11.9 TYPE 2 DIABETES MELLITUS: Status: RESOLVED | Noted: 2018-04-13 | Resolved: 2018-04-14

## 2018-04-14 PROBLEM — A41.9 SEVERE SEPSIS: Status: RESOLVED | Noted: 2018-04-13 | Resolved: 2018-04-14

## 2018-04-14 PROBLEM — R65.20 SEVERE SEPSIS: Status: RESOLVED | Noted: 2018-04-13 | Resolved: 2018-04-14

## 2018-04-14 LAB
ALBUMIN SERPL BCP-MCNC: 3.2 G/DL
ALP SERPL-CCNC: 71 U/L
ALT SERPL W/O P-5'-P-CCNC: 14 U/L
ANION GAP SERPL CALC-SCNC: 8 MMOL/L
AST SERPL-CCNC: 15 U/L
BASOPHILS # BLD AUTO: ABNORMAL K/UL
BASOPHILS NFR BLD: 0 %
BILIRUB SERPL-MCNC: 0.7 MG/DL
BUN SERPL-MCNC: 9 MG/DL
CALCIUM SERPL-MCNC: 9.1 MG/DL
CHLORIDE SERPL-SCNC: 106 MMOL/L
CO2 SERPL-SCNC: 21 MMOL/L
CREAT SERPL-MCNC: 1.1 MG/DL
DIFFERENTIAL METHOD: ABNORMAL
EOSINOPHIL # BLD AUTO: ABNORMAL K/UL
EOSINOPHIL NFR BLD: 0 %
ERYTHROCYTE [DISTWIDTH] IN BLOOD BY AUTOMATED COUNT: 13 %
EST. GFR  (AFRICAN AMERICAN): >60 ML/MIN/1.73 M^2
EST. GFR  (NON AFRICAN AMERICAN): >60 ML/MIN/1.73 M^2
ESTIMATED AVG GLUCOSE: 103 MG/DL
GLUCOSE SERPL-MCNC: 88 MG/DL
HBA1C MFR BLD HPLC: 5.2 %
HCT VFR BLD AUTO: 39.2 %
HGB BLD-MCNC: 13.4 G/DL
HIV1+2 IGG SERPL QL IA.RAPID: NEGATIVE
LYMPHOCYTES # BLD AUTO: ABNORMAL K/UL
LYMPHOCYTES NFR BLD: 8 %
MCH RBC QN AUTO: 31.8 PG
MCHC RBC AUTO-ENTMCNC: 34.2 G/DL
MCV RBC AUTO: 93 FL
MONOCYTES # BLD AUTO: ABNORMAL K/UL
MONOCYTES NFR BLD: 3 %
NEUTROPHILS NFR BLD: 75 %
NEUTS BAND NFR BLD MANUAL: 14 %
PLATELET # BLD AUTO: 232 K/UL
PMV BLD AUTO: 10.5 FL
POCT GLUCOSE: 142 MG/DL (ref 70–110)
POTASSIUM SERPL-SCNC: 3.7 MMOL/L
PROCALCITONIN SERPL IA-MCNC: 9.37 NG/ML
PROT SERPL-MCNC: 6.7 G/DL
RBC # BLD AUTO: 4.22 M/UL
SODIUM SERPL-SCNC: 135 MMOL/L
WBC # BLD AUTO: 27.5 K/UL

## 2018-04-14 PROCEDURE — 94640 AIRWAY INHALATION TREATMENT: CPT

## 2018-04-14 PROCEDURE — 94761 N-INVAS EAR/PLS OXIMETRY MLT: CPT

## 2018-04-14 PROCEDURE — 96365 THER/PROPH/DIAG IV INF INIT: CPT

## 2018-04-14 PROCEDURE — 25000242 PHARM REV CODE 250 ALT 637 W/ HCPCS: Performed by: NURSE PRACTITIONER

## 2018-04-14 PROCEDURE — 63600175 PHARM REV CODE 636 W HCPCS: Performed by: HOSPITALIST

## 2018-04-14 PROCEDURE — 85025 COMPLETE CBC W/AUTO DIFF WBC: CPT

## 2018-04-14 PROCEDURE — 96375 TX/PRO/DX INJ NEW DRUG ADDON: CPT

## 2018-04-14 PROCEDURE — G0378 HOSPITAL OBSERVATION PER HR: HCPCS

## 2018-04-14 PROCEDURE — 80053 COMPREHEN METABOLIC PANEL: CPT

## 2018-04-14 PROCEDURE — 36415 COLL VENOUS BLD VENIPUNCTURE: CPT

## 2018-04-14 PROCEDURE — 86703 HIV-1/HIV-2 1 RESULT ANTBDY: CPT

## 2018-04-14 RX ORDER — ALBUTEROL SULFATE 2.5 MG/.5ML
2.5 SOLUTION RESPIRATORY (INHALATION) EVERY 6 HOURS
Qty: 300 MG | Refills: 0 | Status: SHIPPED | OUTPATIENT
Start: 2018-04-14 | End: 2018-04-14 | Stop reason: HOSPADM

## 2018-04-14 RX ORDER — AZITHROMYCIN 500 MG/1
500 TABLET, FILM COATED ORAL DAILY
Qty: 5 TABLET | Refills: 0 | Status: SHIPPED | OUTPATIENT
Start: 2018-04-14 | End: 2018-04-19

## 2018-04-14 RX ORDER — ALBUTEROL SULFATE 90 UG/1
2 AEROSOL, METERED RESPIRATORY (INHALATION) EVERY 4 HOURS PRN
Qty: 1 INHALER | Refills: 0 | Status: SHIPPED | OUTPATIENT
Start: 2018-04-14 | End: 2018-05-14

## 2018-04-14 RX ADMIN — ALBUTEROL SULFATE 2.5 MG: 2.5 SOLUTION RESPIRATORY (INHALATION) at 12:04

## 2018-04-14 RX ADMIN — CEFTRIAXONE 1 G: 1 INJECTION, SOLUTION INTRAVENOUS at 01:04

## 2018-04-14 RX ADMIN — AZITHROMYCIN MONOHYDRATE 500 MG: 500 INJECTION, POWDER, LYOPHILIZED, FOR SOLUTION INTRAVENOUS at 01:04

## 2018-04-14 NOTE — PROGRESS NOTES
WRITTEN DISCHARGE INFORMATION:     Follow-up Information     Schedule an appointment as soon as possible for a visit with [facility].    Why:  Call the office for appointment  Contact information:  [address] [facility] [address]  [phone]               Things that YOU are responsible for to Manage Your Care At Home:  1. Getting your prescriptions filled.  2. Taking you medications as directed. DO NOT MISS ANY DOSES!  3. Going to your follow-up doctor appointments. This is important because it allows the doctor to monitor your progress and to determine if any changes need to be made to your treatment plan.                                                         Help at Home  After discharge for assistance [facility] Line 24/7 assistance  [phone]   Thank you for choosing [facility] for your care.  Please answer any calls you may receive from [facility].  Sincerely, Your [facility] Healthcare Manager is,  Marga Dean RN [facility] [phone]

## 2018-04-14 NOTE — PLAN OF CARE
Problem: Patient Care Overview  Goal: Plan of Care Review  Outcome: Outcome(s) achieved Date Met: 04/14/18 04/14/18 1033   Coping/Psychosocial   Plan Of Care Reviewed With patient       Problem: Fall Risk (Adult)  Goal: Absence of Falls  Patient will demonstrate the desired outcomes by discharge/transition of care.   Outcome: Outcome(s) achieved Date Met: 04/14/18 04/14/18 1033   Fall Risk (Adult)   Absence of Falls achieves outcome

## 2018-04-15 LAB
BACTERIA THROAT CULT: NORMAL
BACTERIA UR CULT: NORMAL

## 2018-04-15 NOTE — HOSPITAL COURSE
Mr. Corona was placed in observation for further evaluation and treatment of CAP.  He was initially diagnosed with pneumonia in Feb 2018, but did not fill his antibiotic prescription.  He presented with fever, chills, generalized body aches, nausea, and vomiting.  Chest xray demonstrated RLL focal consolidation, WBC 25, tachypnea, tachycardia, and lactic 2.6 indicating sepsis due to RLL pneumonia.  Blood cultures were obtained, he was bolused 30 mL/kg normal saline, and IV antibiotics begun.  CT abd/pelvis also revealed a mild ileus without evidence of obstruction.  He received supportive care with IV fluids and antiemetics.  He improved clinically, afebrile, vital signs WNL, and lactic returned to normal level.  He is tolerating PO, feels well, and requests discharge.  He received an additional dose of IV azithromycin and ceftriaxone and antibiotics were then transitioned to PO.  The importance of antibiotic compliance was strongly stressed.  Repeat procalcitonin and CBC scheduled for 4//16/18 and he is directed to follow up with [facility].  Strict return instructions provided.  He verbalizes understanding.  Discharged home in stable condition.

## 2018-04-15 NOTE — DISCHARGE SUMMARY
[facility] Hospital Medicine  Discharge Summary      Patient Name: Rosalio Corona  MRN: [id number]  Admission Date: 4/13/2018  Hospital Length of Stay: 0 days  Discharge Date and Time: 4/14/2018  2:51 PM  Attending Physician: Keena Tee MD  Discharging Provider: Dominic Long Jr, NP  Primary Care Provider: Primary Doctor No      HPI:   Patient is a 32-year-old male with past medical history asthma,DM,HTN,seizuer , who reports last 2 days he started with generalized body aches the proceeded to chills and fever that was subjective followed by nausea and vomiting that was bilious at times.  He reported a decreased appetite. He is reported to have a history of pneumonia in the right lower lobe 2 months ago and was prescribed antibiotics but reported that he did not take them.patient  Has severe sepsis with leucocytosis 25,tachypnea,tachycardia and elevated lactic acid 2.6.CT abdomen show left lower lobe pneumonia,he has also mild ileus,with no sign of  mass or obstruction,he denies abdominal pain,he has BM,patient has been started on IV Abx and IV antiemetic.    * No surgery found *      Hospital Course:   Mr. Corona was placed in observation for further evaluation and treatment of CAP.  He was initially diagnosed with pneumonia in Feb 2018, but did not fill his antibiotic prescription.  He presented with fever, chills, generalized body aches, nausea, and vomiting.  Chest xray demonstrated RLL focal consolidation, WBC 25, tachypnea, tachycardia, and lactic 2.6 indicating sepsis due to RLL pneumonia.  Blood cultures were obtained, he was bolused 30 mL/kg normal saline, and IV antibiotics begun.  CT abd/pelvis also revealed a mild ileus without evidence of obstruction.  He received supportive care with IV fluids and antiemetics.  He improved clinically, afebrile, vital signs WNL, and lactic returned to normal level.  He is tolerating PO, feels well, and requests discharge.  He received an  additional dose of IV azithromycin and ceftriaxone and antibiotics were then transitioned to PO.  The importance of antibiotic compliance was strongly stressed.  Repeat procalcitonin and CBC scheduled for 4//16/18 and he is directed to follow up with [facility].  Strict return instructions provided.  He verbalizes understanding.  Discharged home in stable condition.     Consults:     No new Assessment & Plan notes have been filed under this hospital service since the last note was generated.  Service: Hospital Medicine    Final Active Diagnoses:    Diagnosis Date Noted POA    Pneumonia of right lower lobe due to infectious organism [J18.1] 04/13/2018 Yes      Problems Resolved During this Admission:    Diagnosis Date Noted Date Resolved POA    PRINCIPAL PROBLEM:  Severe sepsis [A41.9, R65.20] 04/13/2018 04/14/2018 Yes    Type 2 diabetes mellitus [E11.9] 04/13/2018 04/14/2018 Yes    Ileus [K56.7] 04/13/2018 04/14/2018 Yes    Nausea and vomiting [R11.2] 04/13/2018 04/14/2018 Yes    Seizure disorder [G40.909] 04/13/2018 04/14/2018 Yes       Discharged Condition: stable    Disposition: Home or Self Care    Follow Up:  Follow-up Information     Schedule an appointment as soon as possible for a visit with  [facility].    Why:  Call the office for appointment  Contact information:  [facility] [facility] [address] [phone]                 Patient Instructions:     Procalcitonin   Standing Status: Future  Standing Exp. Date: 06/13/19     CBC W/ AUTO DIFFERENTIAL   Standing Status: Future  Standing Exp. Date: 06/13/19     Diet Adult Regular     Activity as tolerated         Significant Diagnostic Studies: Labs:   CMP   Recent Labs  Lab 04/13/18  1219 04/14/18  0559   * 135*   K 4.1 3.7    106   CO2 24 21*    88   BUN 12 9   CREATININE 1.2 1.1   CALCIUM 9.8 9.1   PROT 7.9 6.7   ALBUMIN 4.0 3.2*   BILITOT 1.4* 0.7   ALKPHOS 75 71   AST 19 15   ALT 17 14   ANIONGAP 9 8    ESTGFRAFRICA >60 >60   EGFRNONAA >60 >60    and CBC   Recent Labs  Lab 04/13/18  1219 04/14/18  0559   WBC 25.64* 27.50*   HGB 14.7 13.4*   HCT 42.3 39.2*    232     Radiology: X-Ray: CXR: Findings concerning for worsening right lower lobe airspace disease including aspiration or pneumonia in the proper clinical setting.  Short-term follow-up chest radiography after therapy is recommended to resolution.    CT scan: CT ABDOMEN PELVIS WITH CONTRAST:   Results for orders placed or performed during the hospital encounter of 04/13/18   CT Abdomen Pelvis With Contrast    Narrative    EXAMINATION:  CT ABDOMEN PELVIS WITH CONTRAST    CLINICAL HISTORY:  abdominal pain;    TECHNIQUE:  Low dose axial images, sagittal and coronal reformations were obtained from the lung bases to the pubic symphysis following the IV administration of 75 mL of Omnipaque 350 contrast.  No oral contrast was administered.    COMPARISON:  Chest radiograph same day and CT abdomen and pelvis 05/09/2013.    FINDINGS:  Study is somewhat limited by paucity of intra-abdominal fat with closely apposed loops of bowel.  There is also technical artifact as well as beam hardening with streak artifact from overlying monitoring leads limiting evaluation.    There is partial consolidation with adjacent ill-defined ground-glass attenuation as well as multiple subcentimeter subtle ground-glass nodules involving the imaged right lower lobe, primarily along the anterior and lateral aspect suggestive for nonspecific pneumonia.  Aspiration is considered less likely given the distribution.  No associated pleural fluid.  Imaged left lung base is clear.  The heart is normal in size without significant pericardial fluid.    Liver is enlarged without focal process seen.  The gallbladder, spleen, stomach, duodenum, pancreas and bilateral adrenal glands are within normal limits.  No biliary ductal dilatation.    Kidneys are normal in size and location with stable left  renal lower pole calculus and adjacent cortical scarring.  No hydronephrosis or significant perinephric stranding.  Bilateral ureters are normal in course and caliber.  Urinary bladder is within normal limits.  Prostate and seminal vesicles are within normal limits.  No significant amount of free pelvic fluid.    Appendix and terminal ileum are within normal limits.  Colon is relatively decompressed along the majority of its course.  There are several distended but not yet pathologically dilated gas and fluid-filled small bowel loops noted throughout the abdomen and pelvis without definite focal wall thickening or adjacent inflammatory change.  No pneumatosis or portal venous gas.  Tiny fat containing umbilical hernia.    No ascites, free air or lymphadenopathy definitively seen.    Aorta is normal in course and caliber.    Osseous structures appear intact.      Impression    1.   Findings concerning for right lower lobe airspace disease including nonspecific pneumonia from infectious or inflammatory etiology.  Aspiration is considered less likely.  Given the corresponding abnormality on conventional radiography performed same date, short-term follow-up with chest radiography after therapy is recommended to ensure resolution.    2.   Distended but not yet pathologically dilated gas and fluid-filled small bowel loops throughout the abdomen and pelvis without definite focal wall thickening or adjacent inflammatory change.  These could represent sequela of a nonspecific enteritis, with impending ileus or obstruction not entirely excluded.  No CT evidence of high-grade obstruction at this time.    3.   Hepatomegaly.    4.   Stable nonobstructing nephrolith with cortical scarring at the left renal lower pole.    This report was flagged in Epic as abnormal.      Electronically signed by: Victor Hugo Tsai MD  Date:    04/13/2018  Time:    15:10        Pending Diagnostic Studies:     None         Medications:  Reconciled Home  Medications:      Medication List      START taking these medications    azithromycin 500 MG tablet  Commonly known as:  ZITHROMAX  Take 1 tablet (500 mg total) by mouth once daily.        CONTINUE taking these medications    acetaminophen 325 MG tablet  Commonly known as:  TYLENOL  Take 325 mg by mouth every 6 (six) hours as needed.     albuterol 90 mcg/actuation inhaler  Inhale 2 puffs into the lungs every 4 (four) hours as needed for Wheezing or Shortness of Breath (shortness of breath/wheezing).            Indwelling Lines/Drains at time of discharge:   Lines/Drains/Airways          No matching active lines, drains, or airways          Time spent on the discharge of patient: less than 30 minutes  Patient was seen and examined on the date of discharge and determined to be suitable for discharge.    Dominic Long Jr., APRN, [facility]-BC  Hospitalist - Department of Hospital Medicine  [facility] [address] [place] [address]  Office #: [phone]; Pager #: [phone]

## 2018-04-18 LAB
BACTERIA BLD CULT: NORMAL
BACTERIA BLD CULT: NORMAL

## 2020-11-08 ENCOUNTER — HOSPITAL ENCOUNTER (EMERGENCY)
Facility: HOSPITAL | Age: 35
Discharge: HOME | End: 2020-11-08
Attending: EMERGENCY MEDICINE
Payer: OTHER GOVERNMENT

## 2020-11-08 VITALS
DIASTOLIC BLOOD PRESSURE: 77 MMHG | SYSTOLIC BLOOD PRESSURE: 112 MMHG | OXYGEN SATURATION: 100 % | TEMPERATURE: 99 F | HEIGHT: 73 IN | HEART RATE: 88 BPM | RESPIRATION RATE: 20 BRPM | BODY MASS INDEX: 22.93 KG/M2 | WEIGHT: 173 LBS

## 2020-11-08 DIAGNOSIS — J06.9 VIRAL URI WITH COUGH: Primary | ICD-10-CM

## 2020-11-08 LAB
CTP QC/QA: YES
CTP QC/QA: YES
POC MOLECULAR INFLUENZA A AGN: NEGATIVE
POC MOLECULAR INFLUENZA B AGN: NEGATIVE
SARS-COV-2 RDRP RESP QL NAA+PROBE: NEGATIVE

## 2020-11-08 PROCEDURE — U0002 COVID-19 LAB TEST NON-CDC: HCPCS | Performed by: PHYSICIAN ASSISTANT

## 2020-11-08 PROCEDURE — 93010 EKG 12-LEAD: ICD-10-PCS | Mod: ,,, | Performed by: INTERNAL MEDICINE

## 2020-11-08 PROCEDURE — 87502 INFLUENZA DNA AMP PROBE: CPT

## 2020-11-08 PROCEDURE — 93005 ELECTROCARDIOGRAM TRACING: CPT

## 2020-11-08 PROCEDURE — 99284 EMERGENCY DEPT VISIT MOD MDM: CPT | Mod: 25

## 2020-11-08 PROCEDURE — 93010 ELECTROCARDIOGRAM REPORT: CPT | Mod: ,,, | Performed by: INTERNAL MEDICINE

## 2020-11-08 RX ORDER — ALBUTEROL SULFATE 90 UG/1
1-2 AEROSOL, METERED RESPIRATORY (INHALATION) EVERY 6 HOURS PRN
Qty: 6.7 G | Refills: 0 | Status: SHIPPED | OUTPATIENT
Start: 2020-11-08 | End: 2021-11-08

## 2020-11-08 RX ORDER — CETIRIZINE HYDROCHLORIDE 10 MG/1
10 TABLET ORAL DAILY
Qty: 30 TABLET | Refills: 0 | Status: SHIPPED | OUTPATIENT
Start: 2020-11-08 | End: 2020-12-08

## 2020-11-08 RX ORDER — PROMETHAZINE HYDROCHLORIDE AND DEXTROMETHORPHAN HYDROBROMIDE 6.25; 15 MG/5ML; MG/5ML
5 SYRUP ORAL NIGHTLY PRN
Qty: 118 ML | Refills: 0 | Status: SHIPPED | OUTPATIENT
Start: 2020-11-08 | End: 2020-11-18

## 2020-11-08 NOTE — Clinical Note
"Rosalio"Sylwia Corona was seen and treated in our emergency department on 11/8/2020.     COVID-19 is present in our communities across the state. There is limited testing for COVID at this time, so not all patients can be tested. In this situation, your employee meets the following criteria:    Rosalio Corona has met the criteria for COVID-19 testing and has a NEGATIVE result. The employee can return to work once they are asymptomatic for 72 hours without the use of fever reducing medications (Tylenol, Motrin, etc).     If you have any questions or concerns, or if I can be of further assistance, please do not hesitate to contact me.    Sincerely,             Fracisco Whitmore PA-C"

## 2020-11-08 NOTE — ED TRIAGE NOTES
Pt arrives to the ED reports experiencing dry cough, SOB,chest pain and runny noise for abt a week. Denies fever

## 2020-11-09 NOTE — ED PROVIDER NOTES
Encounter Date: 2020       History     Chief Complaint   Patient presents with    Cough     Pt c/o dry cough, runny nose, intermittent SOB x1 week. 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 Denies fever, sore throat, abd pain, N/V/D. Pain in chest when coughing, rated 3/10     Chief Complaint:  Cough  History of  Present Illness: History obtained from patient. This 35 y.o. male who has past medical history of seizures presents to the ED complaining of nonproductive cough for 1 week with associated congestion rhinorrhea, intermittent shortness of breath, nausea and postnasal drip.  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  Denies chest pain, vomiting, diarrhea, fever.  Patient tender treatment NyQuil without relief.        Review of patient's allergies indicates:   Allergen Reactions    Baclofen Anaphylaxis    Vicodin [hydrocodone-acetaminophen] Hives     syncope     Past Medical History:   Diagnosis Date    Pneumonia 2018    Seizure disorder     Smokes cigarettes     quit in 2018     Past Surgical History:   Procedure Laterality Date    WRIST SURGERY      As a child, L     Family History   Problem Relation Age of Onset    Diabetes Mother     Heart disease Mother     Hypertension Mother      Social History     Tobacco Use    Smoking status: Former Smoker     Packs/day: 1.00     Types: Cigarettes     Quit date: 2018     Years since quittin.7    Smokeless tobacco: Never Used   Substance Use Topics    Alcohol use: No    Drug use: No     Review of Systems   Constitutional: Negative for chills and fever.   HENT: Positive for congestion, postnasal drip and rhinorrhea. Negative for sore throat.    Eyes: Negative for visual disturbance.   Respiratory: Positive for cough and shortness of breath.    Cardiovascular: Negative for chest pain.   Gastrointestinal: Positive for nausea. Negative for abdominal pain, diarrhea and vomiting.    Genitourinary: Negative for dysuria, frequency and hematuria.   Musculoskeletal: Negative for back pain.   Skin: Negative for rash.   Neurological: Negative for dizziness, weakness and headaches.       Physical Exam     Initial Vitals [11/08/20 1731]   BP Pulse Resp Temp SpO2   114/81 77 18 98.6 °F (37 °C) 99 %      MAP       --         Physical Exam    Nursing note and vitals reviewed.  Constitutional: He appears well-developed and well-nourished. No distress.   HENT:   Head: Normocephalic and atraumatic.   Right Ear: Tympanic membrane normal.   Left Ear: Tympanic membrane normal.   Nose: Nose normal.   Mouth/Throat: Uvula is midline, oropharynx is clear and moist and mucous membranes are normal.   There is cobblestoning in the posterior oropharynx.   Eyes: EOM are normal. Pupils are equal, round, and reactive to light.   Neck: Trachea normal, normal range of motion, full passive range of motion without pain and phonation normal. Neck supple. No stridor present. No spinous process tenderness and no muscular tenderness present. Normal range of motion present. No neck rigidity.   Cardiovascular: Normal rate, regular rhythm and normal heart sounds. Exam reveals no gallop and no friction rub.    No murmur heard.  Pulmonary/Chest: Effort normal and breath sounds normal. No respiratory distress. He has no wheezes. He has no rhonchi. He has no rales.   Abdominal: Soft. Bowel sounds are normal. He exhibits no mass. There is no abdominal tenderness. There is no rebound and no guarding.   Musculoskeletal: Normal range of motion.   Neurological: He is alert and oriented to person, place, and time. He has normal strength. No cranial nerve deficit or sensory deficit.   Skin: Skin is warm and dry. Capillary refill takes less than 2 seconds.   Psychiatric: He has a normal mood and affect.         ED Course   Procedures  Labs Reviewed   POCT INFLUENZA A/B MOLECULAR   SARS-COV-2 RDRP GENE          Imaging Results          X-Ray  Chest AP Portable (Final result)  Result time 11/08/20 18:00:35    Final result by Shauna Kumari MD (11/08/20 18:00:35)                 Impression:      No acute cardiopulmonary process identified.      Electronically signed by: Shauna Kumari MD  Date:    11/08/2020  Time:    18:00             Narrative:    EXAMINATION:  XR CHEST AP PORTABLE    CLINICAL HISTORY:  cough and SOB;    TECHNIQUE:  Single frontal view of the chest was performed.    COMPARISON:  April 2018.    FINDINGS:  Cardiac silhouette is normal in size.  Lungs are symmetrically expanded.  No evidence of focal consolidative process, pneumothorax, or significant pleural effusion.  No acute osseous abnormality identified.                                 Medical Decision Making:   Clinical Tests:   Lab Tests: Ordered and Reviewed  Radiological Study: Ordered and Reviewed  ED Management:  This is an evaluation of a 35 y.o. male that presents to the Emergency Department for cough, rhinorrhea and nasal congestion for 7 days. The patient is a non-toxic, afebrile, and well appearing male. On physical exam ears and pharynx are without evidence of infection. Appears well hydrated with moist mucus membranes. Neck soft and supple with no meningeal signs or cervical lymphadenopathy. Breath sounds are clear and equal bilaterally with no adventitious breath sounds, tachypnea or respiratory distress with room air pulse ox of 99% and no evidence of hypoxia.     Vital Signs Are Reassuring.  RESULTS:  COVID negative.  Influenza negative.  Chest x-ray shows no acute cardiopulmonary processes.    My overall impression is Viral URI. I considered, but at this time, do not suspect OM, OE, strep pharyngitis, meningitis, pneumonia, or acute bacterial sinusitis.    Will discharge patient home symptomatic care.  The diagnosis, treatment plan, instructions for follow-up and reevaluation with PCP as well as ED return precautions were discussed and understanding was verbalized.  All questions or concerns have been addressed.                              Clinical Impression:       ICD-10-CM ICD-9-CM   1. Viral URI with cough  J06.9 465.9                          ED Disposition Condition    Discharge Stable        ED Prescriptions     Medication Sig Dispense Start Date End Date Auth. Provider    promethazine-dextromethorphan (PROMETHAZINE-DM) 6.25-15 mg/5 mL Syrp Take 5 mLs by mouth nightly as needed (Cough). 118 mL 11/8/2020 11/18/2020 Fracisco Whitmore PA-C    albuterol (PROVENTIL/VENTOLIN HFA) 90 mcg/actuation inhaler Inhale 1-2 puffs into the lungs every 6 (six) hours as needed for Wheezing or Shortness of Breath. Rescue 6.7 g 11/8/2020 11/8/2021 Fracisco Whitmore PA-C    cetirizine (ZYRTEC) 10 MG tablet Take 1 tablet (10 mg total) by mouth once daily. 30 tablet 11/8/2020 12/8/2020 Fracisco Whitmore PA-C        Follow-up Information     Follow up With Specialties Details Why Contact Info    [facility]  Schedule an appointment as soon as possible for a visit in 1 day For reevaluation [address] [place] [address]  [phone]      [facility] Go in 1 day If symptoms worsen [address]  [facility] [id number] [phone]                                       Fracisco Whitmore PA-C  11/08/20 [id number]

## 2021-03-16 ENCOUNTER — HOSPITAL ENCOUNTER (EMERGENCY)
Facility: HOSPITAL | Age: 36
Discharge: HOME | End: 2021-03-16
Attending: EMERGENCY MEDICINE

## 2021-03-16 VITALS
HEIGHT: 73 IN | OXYGEN SATURATION: 100 % | TEMPERATURE: 99 F | BODY MASS INDEX: 22.53 KG/M2 | HEART RATE: 82 BPM | SYSTOLIC BLOOD PRESSURE: 105 MMHG | RESPIRATION RATE: 19 BRPM | WEIGHT: 170 LBS | DIASTOLIC BLOOD PRESSURE: 69 MMHG

## 2021-03-16 DIAGNOSIS — M54.41 ACUTE RIGHT-SIDED LOW BACK PAIN WITH RIGHT-SIDED SCIATICA: Primary | ICD-10-CM

## 2021-03-16 PROCEDURE — 25000003 PHARM REV CODE 250: Performed by: PHYSICIAN ASSISTANT

## 2021-03-16 PROCEDURE — 99284 EMERGENCY DEPT VISIT MOD MDM: CPT | Mod: 25

## 2021-03-16 PROCEDURE — 96372 THER/PROPH/DIAG INJ SC/IM: CPT | Mod: 59

## 2021-03-16 PROCEDURE — 63600175 PHARM REV CODE 636 W HCPCS: Performed by: PHYSICIAN ASSISTANT

## 2021-03-16 RX ORDER — TRIAMCINOLONE ACETONIDE 40 MG/ML
60 INJECTION, SUSPENSION INTRA-ARTICULAR; INTRAMUSCULAR
Status: COMPLETED | OUTPATIENT
Start: 2021-03-16 | End: 2021-03-16

## 2021-03-16 RX ORDER — HYDROCODONE BITARTRATE AND ACETAMINOPHEN 5; 325 MG/1; MG/1
1 TABLET ORAL EVERY 6 HOURS PRN
Qty: 11 TABLET | Refills: 0 | Status: SHIPPED | OUTPATIENT
Start: 2021-03-16

## 2021-03-16 RX ORDER — LIDOCAINE 50 MG/G
1 PATCH TOPICAL DAILY
Qty: 15 PATCH | Refills: 0 | Status: SHIPPED | OUTPATIENT
Start: 2021-03-16

## 2021-03-16 RX ORDER — LIDOCAINE 50 MG/G
1 PATCH TOPICAL
Status: DISCONTINUED | OUTPATIENT
Start: 2021-03-16 | End: 2021-03-16 | Stop reason: HOSPADM

## 2021-03-16 RX ORDER — KETOROLAC TROMETHAMINE 30 MG/ML
30 INJECTION, SOLUTION INTRAMUSCULAR; INTRAVENOUS
Status: COMPLETED | OUTPATIENT
Start: 2021-03-16 | End: 2021-03-16

## 2021-03-16 RX ORDER — KETOROLAC TROMETHAMINE 10 MG/1
10 TABLET, FILM COATED ORAL EVERY 6 HOURS
Qty: 20 TABLET | Refills: 0 | Status: SHIPPED | OUTPATIENT
Start: 2021-03-16 | End: 2021-03-21

## 2021-03-16 RX ADMIN — KETOROLAC TROMETHAMINE 30 MG: 30 INJECTION, SOLUTION INTRAMUSCULAR; INTRAVENOUS at 01:03

## 2021-03-16 RX ADMIN — TRIAMCINOLONE ACETONIDE 60 MG: 40 INJECTION, SUSPENSION INTRA-ARTICULAR; INTRAMUSCULAR at 01:03

## 2021-03-16 RX ADMIN — LIDOCAINE 1 PATCH: 50 PATCH TOPICAL at 12:03

## 2021-04-20 ENCOUNTER — HOSPITAL ENCOUNTER (EMERGENCY)
Facility: HOSPITAL | Age: 36
Discharge: HOME | End: 2021-04-20
Attending: EMERGENCY MEDICINE

## 2021-04-20 VITALS
RESPIRATION RATE: 18 BRPM | TEMPERATURE: 98 F | HEIGHT: 73 IN | OXYGEN SATURATION: 98 % | DIASTOLIC BLOOD PRESSURE: 59 MMHG | HEART RATE: 70 BPM | BODY MASS INDEX: 22.93 KG/M2 | SYSTOLIC BLOOD PRESSURE: 99 MMHG | WEIGHT: 173 LBS

## 2021-04-20 DIAGNOSIS — R10.31 RIGHT LOWER QUADRANT ABDOMINAL PAIN: Primary | ICD-10-CM

## 2021-04-20 LAB
ALBUMIN SERPL BCP-MCNC: 4.1 G/DL (ref 3.5–5.2)
ALP SERPL-CCNC: 59 U/L (ref 55–135)
ALT SERPL W/O P-5'-P-CCNC: 24 U/L (ref 10–44)
ANION GAP SERPL CALC-SCNC: 5 MMOL/L (ref 8–16)
AST SERPL-CCNC: 22 U/L (ref 10–40)
BASOPHILS # BLD AUTO: 0.02 K/UL (ref 0–0.2)
BASOPHILS NFR BLD: 0.3 % (ref 0–1.9)
BILIRUB SERPL-MCNC: 0.8 MG/DL (ref 0.1–1)
BILIRUB UR QL STRIP: NEGATIVE
BUN SERPL-MCNC: 11 MG/DL (ref 6–20)
CALCIUM SERPL-MCNC: 8.3 MG/DL (ref 8.7–10.5)
CHLORIDE SERPL-SCNC: 107 MMOL/L (ref 95–110)
CLARITY UR: CLEAR
CO2 SERPL-SCNC: 26 MMOL/L (ref 23–29)
COLOR UR: YELLOW
CREAT SERPL-MCNC: 1.1 MG/DL (ref 0.5–1.4)
DIFFERENTIAL METHOD: ABNORMAL
EOSINOPHIL # BLD AUTO: 0.1 K/UL (ref 0–0.5)
EOSINOPHIL NFR BLD: 1.4 % (ref 0–8)
ERYTHROCYTE [DISTWIDTH] IN BLOOD BY AUTOMATED COUNT: 12.8 % (ref 11.5–14.5)
EST. GFR  (AFRICAN AMERICAN): >60 ML/MIN/1.73 M^2
EST. GFR  (NON AFRICAN AMERICAN): >60 ML/MIN/1.73 M^2
GLUCOSE SERPL-MCNC: 88 MG/DL (ref 70–110)
GLUCOSE UR QL STRIP: NEGATIVE
HCT VFR BLD AUTO: 40.5 % (ref 40–54)
HGB BLD-MCNC: 13.8 G/DL (ref 14–18)
HGB UR QL STRIP: NEGATIVE
IMM GRANULOCYTES # BLD AUTO: 0.01 K/UL (ref 0–0.04)
IMM GRANULOCYTES NFR BLD AUTO: 0.1 % (ref 0–0.5)
KETONES UR QL STRIP: NEGATIVE
LEUKOCYTE ESTERASE UR QL STRIP: NEGATIVE
LIPASE SERPL-CCNC: 20 U/L (ref 4–60)
LYMPHOCYTES # BLD AUTO: 0.7 K/UL (ref 1–4.8)
LYMPHOCYTES NFR BLD: 10 % (ref 18–48)
MCH RBC QN AUTO: 32.7 PG (ref 27–31)
MCHC RBC AUTO-ENTMCNC: 34.1 G/DL (ref 32–36)
MCV RBC AUTO: 96 FL (ref 82–98)
MONOCYTES # BLD AUTO: 0.4 K/UL (ref 0.3–1)
MONOCYTES NFR BLD: 6 % (ref 4–15)
NEUTROPHILS # BLD AUTO: 6.1 K/UL (ref 1.8–7.7)
NEUTROPHILS NFR BLD: 82.2 % (ref 38–73)
NITRITE UR QL STRIP: NEGATIVE
NRBC BLD-RTO: 0 /100 WBC
PH UR STRIP: 6 [PH] (ref 5–8)
PLATELET # BLD AUTO: 215 K/UL (ref 150–450)
PMV BLD AUTO: 9.2 FL (ref 9.2–12.9)
POTASSIUM SERPL-SCNC: 4.2 MMOL/L (ref 3.5–5.1)
PROT SERPL-MCNC: 6.9 G/DL (ref 6–8.4)
PROT UR QL STRIP: NEGATIVE
RBC # BLD AUTO: 4.22 M/UL (ref 4.6–6.2)
SODIUM SERPL-SCNC: 138 MMOL/L (ref 136–145)
SP GR UR STRIP: 1.02 (ref 1–1.03)
URN SPEC COLLECT METH UR: NORMAL
UROBILINOGEN UR STRIP-ACNC: NEGATIVE EU/DL
WBC # BLD AUTO: 7.39 K/UL (ref 3.9–12.7)

## 2021-04-20 PROCEDURE — 85025 COMPLETE CBC W/AUTO DIFF WBC: CPT | Performed by: PHYSICIAN ASSISTANT

## 2021-04-20 PROCEDURE — 80053 COMPREHEN METABOLIC PANEL: CPT | Performed by: PHYSICIAN ASSISTANT

## 2021-04-20 PROCEDURE — 96374 THER/PROPH/DIAG INJ IV PUSH: CPT

## 2021-04-20 PROCEDURE — 81003 URINALYSIS AUTO W/O SCOPE: CPT | Performed by: PHYSICIAN ASSISTANT

## 2021-04-20 PROCEDURE — 99285 EMERGENCY DEPT VISIT HI MDM: CPT | Mod: 25

## 2021-04-20 PROCEDURE — 25000003 PHARM REV CODE 250: Performed by: PHYSICIAN ASSISTANT

## 2021-04-20 PROCEDURE — 63600175 PHARM REV CODE 636 W HCPCS: Performed by: PHYSICIAN ASSISTANT

## 2021-04-20 PROCEDURE — 96361 HYDRATE IV INFUSION ADD-ON: CPT

## 2021-04-20 PROCEDURE — 83690 ASSAY OF LIPASE: CPT | Performed by: PHYSICIAN ASSISTANT

## 2021-04-20 PROCEDURE — 25500020 PHARM REV CODE 255: Performed by: EMERGENCY MEDICINE

## 2021-04-20 PROCEDURE — 96375 TX/PRO/DX INJ NEW DRUG ADDON: CPT

## 2021-04-20 RX ORDER — KETOROLAC TROMETHAMINE 30 MG/ML
15 INJECTION, SOLUTION INTRAMUSCULAR; INTRAVENOUS
Status: COMPLETED | OUTPATIENT
Start: 2021-04-20 | End: 2021-04-20

## 2021-04-20 RX ORDER — ONDANSETRON 2 MG/ML
4 INJECTION INTRAMUSCULAR; INTRAVENOUS
Status: COMPLETED | OUTPATIENT
Start: 2021-04-20 | End: 2021-04-20

## 2021-04-20 RX ADMIN — ONDANSETRON 4 MG: 2 INJECTION INTRAMUSCULAR; INTRAVENOUS at 01:04

## 2021-04-20 RX ADMIN — KETOROLAC TROMETHAMINE 15 MG: 30 INJECTION, SOLUTION INTRAMUSCULAR; INTRAVENOUS at 01:04

## 2021-04-20 RX ADMIN — SODIUM CHLORIDE 1000 ML: 0.9 INJECTION, SOLUTION INTRAVENOUS at 01:04

## 2021-04-20 RX ADMIN — IOHEXOL 75 ML: 350 INJECTION, SOLUTION INTRAVENOUS at 02:04
